# Patient Record
Sex: FEMALE | Race: BLACK OR AFRICAN AMERICAN | NOT HISPANIC OR LATINO | Employment: FULL TIME | URBAN - METROPOLITAN AREA
[De-identification: names, ages, dates, MRNs, and addresses within clinical notes are randomized per-mention and may not be internally consistent; named-entity substitution may affect disease eponyms.]

---

## 2017-08-18 ENCOUNTER — HISTORICAL (OUTPATIENT)
Dept: ADMINISTRATIVE | Facility: HOSPITAL | Age: 27
End: 2017-08-18

## 2017-08-20 LAB — FINAL CULTURE: NO GROWTH

## 2017-10-04 ENCOUNTER — HISTORICAL (OUTPATIENT)
Dept: ADMINISTRATIVE | Facility: HOSPITAL | Age: 27
End: 2017-10-04

## 2017-11-21 ENCOUNTER — HOSPITAL ENCOUNTER (OUTPATIENT)
Dept: OBSTETRICS AND GYNECOLOGY | Facility: HOSPITAL | Age: 27
End: 2017-11-21
Attending: OBSTETRICS & GYNECOLOGY | Admitting: OBSTETRICS & GYNECOLOGY

## 2017-11-29 ENCOUNTER — HISTORICAL (OUTPATIENT)
Dept: ADMINISTRATIVE | Facility: HOSPITAL | Age: 27
End: 2017-11-29

## 2017-12-13 ENCOUNTER — HISTORICAL (OUTPATIENT)
Dept: ADMINISTRATIVE | Facility: HOSPITAL | Age: 27
End: 2017-12-13

## 2018-01-10 ENCOUNTER — HISTORICAL (OUTPATIENT)
Dept: LAB | Facility: HOSPITAL | Age: 28
End: 2018-01-10

## 2018-01-24 ENCOUNTER — HISTORICAL (OUTPATIENT)
Dept: LAB | Facility: HOSPITAL | Age: 28
End: 2018-01-24

## 2018-02-07 ENCOUNTER — HISTORICAL (OUTPATIENT)
Dept: LAB | Facility: HOSPITAL | Age: 28
End: 2018-02-07

## 2018-02-09 LAB — FINAL CULTURE: NORMAL

## 2018-02-27 ENCOUNTER — HOSPITAL ENCOUNTER (OUTPATIENT)
Dept: OBSTETRICS AND GYNECOLOGY | Facility: HOSPITAL | Age: 28
End: 2018-02-28
Attending: OBSTETRICS & GYNECOLOGY | Admitting: OBSTETRICS & GYNECOLOGY

## 2018-12-02 ENCOUNTER — HISTORICAL (OUTPATIENT)
Dept: ADMINISTRATIVE | Facility: HOSPITAL | Age: 28
End: 2018-12-02

## 2018-12-04 LAB — FINAL CULTURE: NORMAL

## 2019-10-16 ENCOUNTER — HISTORICAL (OUTPATIENT)
Dept: ADMINISTRATIVE | Facility: HOSPITAL | Age: 29
End: 2019-10-16

## 2019-10-16 LAB
ABS NEUT (OLG): 9.71 X10(3)/MCL (ref 2.1–9.2)
ALBUMIN SERPL-MCNC: 4.3 GM/DL (ref 3.4–5)
ALBUMIN/GLOB SERPL: 1 RATIO (ref 1.1–2)
ALP SERPL-CCNC: 173 UNIT/L (ref 45–117)
ALT SERPL-CCNC: 22 UNIT/L (ref 12–78)
AST SERPL-CCNC: 19 UNIT/L (ref 15–37)
BASOPHILS # BLD AUTO: 0.1 X10(3)/MCL (ref 0–0.2)
BASOPHILS NFR BLD AUTO: 0 %
BILIRUB SERPL-MCNC: 0.3 MG/DL (ref 0.2–1)
BILIRUBIN DIRECT+TOT PNL SERPL-MCNC: 0.1 MG/DL (ref 0–0.2)
BILIRUBIN DIRECT+TOT PNL SERPL-MCNC: 0.2 MG/DL
BUN SERPL-MCNC: 11 MG/DL (ref 7–18)
CALCIUM SERPL-MCNC: 8.9 MG/DL (ref 8.5–10.1)
CHLORIDE SERPL-SCNC: 103 MMOL/L (ref 98–107)
CHOLEST SERPL-MCNC: 221 MG/DL
CHOLEST/HDLC SERPL: 4.9 {RATIO} (ref 0–4.4)
CO2 SERPL-SCNC: 27 MMOL/L (ref 21–32)
CREAT SERPL-MCNC: 0.8 MG/DL (ref 0.6–1.3)
EOSINOPHIL # BLD AUTO: 0.3 X10(3)/MCL (ref 0–0.9)
EOSINOPHIL NFR BLD AUTO: 2 %
ERYTHROCYTE [DISTWIDTH] IN BLOOD BY AUTOMATED COUNT: 13.4 % (ref 11.5–14.5)
GLOBULIN SER-MCNC: 4.4 GM/ML (ref 2.3–3.5)
GLUCOSE SERPL-MCNC: 67 MG/DL (ref 74–106)
HCT VFR BLD AUTO: 42.6 % (ref 35–46)
HDLC SERPL-MCNC: 45 MG/DL (ref 40–59)
HGB BLD-MCNC: 13.9 GM/DL (ref 12–16)
IMM GRANULOCYTES # BLD AUTO: 0.05 10*3/UL
IMM GRANULOCYTES NFR BLD AUTO: 0 %
LDLC SERPL CALC-MCNC: 154 MG/DL
LYMPHOCYTES # BLD AUTO: 3.5 X10(3)/MCL (ref 0.6–4.6)
LYMPHOCYTES NFR BLD AUTO: 25 %
MCH RBC QN AUTO: 27.6 PG (ref 26–34)
MCHC RBC AUTO-ENTMCNC: 32.6 GM/DL (ref 31–37)
MCV RBC AUTO: 84.5 FL (ref 80–100)
MONOCYTES # BLD AUTO: 0.6 X10(3)/MCL (ref 0.1–1.3)
MONOCYTES NFR BLD AUTO: 4 %
NEUTROPHILS # BLD AUTO: 9.71 X10(3)/MCL (ref 2.1–9.2)
NEUTROPHILS NFR BLD AUTO: 68 %
PLATELET # BLD AUTO: 417 X10(3)/MCL (ref 130–400)
PMV BLD AUTO: 10 FL (ref 7.4–10.4)
POTASSIUM SERPL-SCNC: 3.8 MMOL/L (ref 3.5–5.1)
PROT SERPL-MCNC: 8.7 GM/DL (ref 6.4–8.2)
RBC # BLD AUTO: 5.04 X10(6)/MCL (ref 4–5.2)
SODIUM SERPL-SCNC: 137 MMOL/L (ref 136–145)
TRIGL SERPL-MCNC: 111 MG/DL
TSH SERPL-ACNC: 0.55 MIU/L (ref 0.36–3.74)
VLDLC SERPL CALC-MCNC: 22 MG/DL
WBC # SPEC AUTO: 14.3 X10(3)/MCL (ref 4.5–11)

## 2020-01-09 ENCOUNTER — HISTORICAL (OUTPATIENT)
Dept: ADMINISTRATIVE | Facility: HOSPITAL | Age: 30
End: 2020-01-09

## 2020-05-20 ENCOUNTER — HISTORICAL (OUTPATIENT)
Dept: LAB | Facility: HOSPITAL | Age: 30
End: 2020-05-20

## 2021-06-08 ENCOUNTER — HISTORICAL (OUTPATIENT)
Dept: ADMINISTRATIVE | Facility: HOSPITAL | Age: 31
End: 2021-06-08

## 2021-11-01 ENCOUNTER — HISTORICAL (OUTPATIENT)
Dept: INTERNAL MEDICINE | Facility: CLINIC | Age: 31
End: 2021-11-01

## 2021-11-01 LAB
ABS NEUT (OLG): 6.93 X10(3)/MCL (ref 2.1–9.2)
ALBUMIN SERPL-MCNC: 4 GM/DL (ref 3.5–5)
ALBUMIN/GLOB SERPL: 1.1 RATIO (ref 1.1–2)
ALP SERPL-CCNC: 122 UNIT/L (ref 40–150)
ALT SERPL-CCNC: 30 UNIT/L (ref 0–55)
APPEARANCE, UA: CLEAR
AST SERPL-CCNC: 36 UNIT/L (ref 5–34)
BACTERIA #/AREA URNS AUTO: ABNORMAL /HPF
BASOPHILS # BLD AUTO: 0 X10(3)/MCL (ref 0–0.2)
BASOPHILS NFR BLD AUTO: 0 %
BILIRUB SERPL-MCNC: 0.6 MG/DL
BILIRUB UR QL STRIP: NEGATIVE
BILIRUBIN DIRECT+TOT PNL SERPL-MCNC: 0.2 MG/DL (ref 0–0.5)
BILIRUBIN DIRECT+TOT PNL SERPL-MCNC: 0.4 MG/DL (ref 0–0.8)
BUN SERPL-MCNC: 12.5 MG/DL (ref 7–18.7)
CALCIUM SERPL-MCNC: 9.3 MG/DL (ref 8.7–10.5)
CHLORIDE SERPL-SCNC: 104 MMOL/L (ref 98–107)
CHOLEST SERPL-MCNC: 200 MG/DL
CHOLEST/HDLC SERPL: 5 {RATIO} (ref 0–5)
CO2 SERPL-SCNC: 25 MMOL/L (ref 22–29)
COLOR UR: YELLOW
CREAT SERPL-MCNC: 0.66 MG/DL (ref 0.55–1.02)
EOSINOPHIL # BLD AUTO: 0.3 X10(3)/MCL (ref 0–0.9)
EOSINOPHIL NFR BLD AUTO: 3 %
ERYTHROCYTE [DISTWIDTH] IN BLOOD BY AUTOMATED COUNT: 12.4 % (ref 11.5–14.5)
EST. AVERAGE GLUCOSE BLD GHB EST-MCNC: 111.2 MG/DL
GLOBULIN SER-MCNC: 3.5 GM/DL (ref 2.4–3.5)
GLUCOSE (UA): NEGATIVE
GLUCOSE SERPL-MCNC: 99 MG/DL (ref 74–100)
HBA1C MFR BLD: 5.5 %
HCT VFR BLD AUTO: 36.3 % (ref 35–46)
HDLC SERPL-MCNC: 42 MG/DL (ref 35–60)
HGB BLD-MCNC: 12.2 GM/DL (ref 12–16)
HGB UR QL STRIP: 1 MG/DL
HYALINE CASTS #/AREA URNS LPF: ABNORMAL /LPF
IMM GRANULOCYTES # BLD AUTO: 0.02 10*3/UL
IMM GRANULOCYTES NFR BLD AUTO: 0 %
KETONES UR QL STRIP: NEGATIVE
LDLC SERPL CALC-MCNC: 141 MG/DL (ref 50–140)
LEUKOCYTE ESTERASE UR QL STRIP: NEGATIVE
LYMPHOCYTES # BLD AUTO: 2.1 X10(3)/MCL (ref 0.6–4.6)
LYMPHOCYTES NFR BLD AUTO: 22 %
MCH RBC QN AUTO: 29.3 PG (ref 26–34)
MCHC RBC AUTO-ENTMCNC: 33.6 GM/DL (ref 31–37)
MCV RBC AUTO: 87.1 FL (ref 80–100)
MONOCYTES # BLD AUTO: 0.4 X10(3)/MCL (ref 0.1–1.3)
MONOCYTES NFR BLD AUTO: 4 %
NEUTROPHILS # BLD AUTO: 6.93 X10(3)/MCL (ref 2.1–9.2)
NEUTROPHILS NFR BLD AUTO: 70 %
NITRITE UR QL STRIP: NEGATIVE
NRBC BLD AUTO-RTO: 0 % (ref 0–0.2)
PH UR STRIP: 8 [PH] (ref 4.5–8)
PLATELET # BLD AUTO: 353 X10(3)/MCL (ref 130–400)
PMV BLD AUTO: 9.5 FL (ref 7.4–10.4)
POTASSIUM SERPL-SCNC: 4 MMOL/L (ref 3.5–5.1)
PROT SERPL-MCNC: 7.5 GM/DL (ref 6.4–8.3)
PROT UR QL STRIP: 10 MG/DL
RBC # BLD AUTO: 4.17 X10(6)/MCL (ref 4–5.2)
RBC #/AREA URNS AUTO: ABNORMAL /HPF
SODIUM SERPL-SCNC: 136 MMOL/L (ref 136–145)
SP GR UR STRIP: 1.02 (ref 1–1.03)
SQUAMOUS #/AREA URNS LPF: ABNORMAL /LPF
TRIGL SERPL-MCNC: 86 MG/DL (ref 37–140)
TSH SERPL-ACNC: 0.24 UIU/ML (ref 0.35–4.94)
UROBILINOGEN UR STRIP-ACNC: 2 MG/DL
VLDLC SERPL CALC-MCNC: 17 MG/DL
WBC # SPEC AUTO: 9.8 X10(3)/MCL (ref 4.5–11)
WBC #/AREA URNS AUTO: ABNORMAL /HPF

## 2021-11-12 ENCOUNTER — HISTORICAL (OUTPATIENT)
Dept: ADMINISTRATIVE | Facility: HOSPITAL | Age: 31
End: 2021-11-12

## 2022-01-07 ENCOUNTER — HISTORICAL (OUTPATIENT)
Dept: ADMINISTRATIVE | Facility: HOSPITAL | Age: 32
End: 2022-01-07

## 2022-04-07 ENCOUNTER — HISTORICAL (OUTPATIENT)
Dept: ADMINISTRATIVE | Facility: HOSPITAL | Age: 32
End: 2022-04-07
Payer: MEDICAID

## 2022-04-07 ENCOUNTER — HISTORICAL (OUTPATIENT)
Dept: LAB | Facility: HOSPITAL | Age: 32
End: 2022-04-07

## 2022-04-07 LAB
ABS NEUT (OLG): 6.47 (ref 2.1–9.2)
ALBUMIN SERPL-MCNC: 3.7 G/DL (ref 3.5–5)
ALBUMIN/GLOB SERPL: 1.1 {RATIO} (ref 1.1–2)
ALP SERPL-CCNC: 127 U/L (ref 40–150)
ALT SERPL-CCNC: 18 U/L (ref 0–55)
AST SERPL-CCNC: 19 U/L (ref 5–34)
BASOPHILS # BLD AUTO: 0.1 10*3/UL (ref 0–0.2)
BASOPHILS NFR BLD AUTO: 1 %
BILIRUB SERPL-MCNC: 0.3 MG/DL
BILIRUBIN DIRECT+TOT PNL SERPL-MCNC: 0.1 (ref 0–0.8)
BILIRUBIN DIRECT+TOT PNL SERPL-MCNC: 0.2 (ref 0–0.5)
BUN SERPL-MCNC: 12.1 MG/DL (ref 7–18.7)
CALCIUM SERPL-MCNC: 8.9 MG/DL (ref 8.7–10.5)
CHLORIDE SERPL-SCNC: 107 MMOL/L (ref 98–107)
CHOLEST SERPL-MCNC: 189 MG/DL
CHOLEST/HDLC SERPL: 4 {RATIO} (ref 0–5)
CO2 SERPL-SCNC: 25 MMOL/L (ref 22–29)
CREAT SERPL-MCNC: 0.69 MG/DL (ref 0.55–1.02)
EOSINOPHIL # BLD AUTO: 0.2 10*3/UL (ref 0–0.9)
EOSINOPHIL NFR BLD AUTO: 2 %
ERYTHROCYTE [DISTWIDTH] IN BLOOD BY AUTOMATED COUNT: 12.7 % (ref 11.5–14.5)
FLAG2 (OHS): 70
FLAG3 (OHS): 80
FLAGS (OHS): 80
GLOBULIN SER-MCNC: 3.5 G/DL (ref 2.4–3.5)
GLUCOSE SERPL-MCNC: 115 MG/DL (ref 74–100)
HCT VFR BLD AUTO: 36.5 % (ref 35–46)
HDLC SERPL-MCNC: 42 MG/DL (ref 35–60)
HEMOLYSIS INTERF INDEX SERPL-ACNC: 4
HGB BLD-MCNC: 12.1 G/DL (ref 12–16)
ICTERIC INTERF INDEX SERPL-ACNC: 0
IMM GRANULOCYTES # BLD AUTO: 0.02 10*3/UL
IMM GRANULOCYTES NFR BLD AUTO: 0 %
LDLC SERPL CALC-MCNC: 131 MG/DL (ref 50–140)
LIPEMIC INTERF INDEX SERPL-ACNC: 3
LOW EVENT # SUSPECT FLAG (OHS): 80
LYMPHOCYTES # BLD AUTO: 2.2 10*3/UL (ref 0.6–4.6)
LYMPHOCYTES NFR BLD AUTO: 23 %
MANUAL DIFF? (OHS): NO
MCH RBC QN AUTO: 28.1 PG (ref 26–34)
MCHC RBC AUTO-ENTMCNC: 33.2 G/DL (ref 31–37)
MCV RBC AUTO: 84.7 FL (ref 80–100)
MO BLASTS SUSPECT FLAG (OHS): 40
MONOCYTES # BLD AUTO: 0.4 10*3/UL (ref 0.1–1.3)
MONOCYTES NFR BLD AUTO: 5 %
NEUTROPHILS # BLD AUTO: 6.47 10*3/UL (ref 2.1–9.2)
NEUTROPHILS NFR BLD AUTO: 69 %
NRBC BLD AUTO-RTO: 0 % (ref 0–0.2)
PLATELET # BLD AUTO: 385 10*3/UL (ref 130–400)
PLATELET CLUMPS SUSPECT FLAG (OHS): 10
PMV BLD AUTO: 10 FL (ref 7.4–10.4)
POTASSIUM SERPL-SCNC: 4 MMOL/L (ref 3.5–5.1)
PROT SERPL-MCNC: 7.2 G/DL (ref 6.4–8.3)
RBC # BLD AUTO: 4.31 10*6/UL (ref 4–5.2)
SODIUM SERPL-SCNC: 137 MMOL/L (ref 136–145)
TRIGL SERPL-MCNC: 79 MG/DL (ref 37–140)
VLDLC SERPL CALC-MCNC: 16 MG/DL
WBC # SPEC AUTO: 9.4 10*3/UL (ref 4.5–11)

## 2022-04-19 ENCOUNTER — HISTORICAL (OUTPATIENT)
Dept: ADMINISTRATIVE | Facility: HOSPITAL | Age: 32
End: 2022-04-19
Payer: MEDICAID

## 2022-04-24 VITALS
HEIGHT: 59 IN | SYSTOLIC BLOOD PRESSURE: 139 MMHG | OXYGEN SATURATION: 100 % | DIASTOLIC BLOOD PRESSURE: 89 MMHG | BODY MASS INDEX: 40.76 KG/M2 | WEIGHT: 202.19 LBS

## 2022-05-01 NOTE — HISTORICAL [facility] CERNER
This is a historical note converted from [employer]. Formatting and pictures may have been removed.  Please reference Cermaria eugenia for original formatting and attached multimedia. Chief Complaint  f/u obesity  History of Present Illness  Pt is a??29?year?old female ?in to establish with PCP. Pt has past medical history of recurrent strep tonsillitis and morbid obesity. Pt did not get her labs done from last visit. Her weight is consistent with prior visit. She has stopped soft drinks but has started drinking Hawaiian Punch. Pt has not had any issues with her tonsils since ED visit on 19.  ?   Morbid ?obesity:?Patient states that she has stopped drinking soft drinks.? She is status post ?approximately?1 year and 5 months for baby boy (2018). One of her favorite foods is fried chicken. She is motivated?to eat healthy and?exercise for the promotion of a healthy?lifestyle. Pt has been exercising 3 times a week for30 minutes.  ?   Tonsillitis history: Patient has had multiple episodes of?recurrent?strep tonsillitis: 2015 + for strep, 2018 + for strep,?2018 exudative?tonsillitis positive for strep, 2019+ for strep?and ?acute pharyngitis and tonsillitis?not positive for strep. ?Patient states that despite her treatment? (amoxicillin and ibuprofen) for?recurrent strep tonsillitis on 2019, she still having?sore throat?with?mild pain for which she is taking ibuprofen.? Today her pain level is 0/10. ?Referral to ENT?for recurrent tonsillitis was done?on 2019.? Patient has the associated symptom of snoring.  ?  ?  Patient denies chest pain, shortness of breath,?dyspnea on exertion, palpitations, peripheral edema,?abdominal pain, nausea, vomiting,?diarrhea,?constipation, fatigue, fever, chills,?dysuria,? hematuria, melena,?or hematochezia.  ?   Preventative:  Pap Smear: SWLA in 2017 or 2018. Pt  TB skin test: Done 2019; pt will sign a medical release to get the record from her job.  Pt  got flu shot a work. Pt works at [employer].  Review of Systems  GENERAL:?Negative for abnormal weight loss or weight gain, fatigue, fever, or chills. Rest of system Negative except for stated in HPI.  HEENT:??Negative for head trauma, blurred vision,??sore throat, earache, sinus congestion,??or hearing loss. Rest of system Negative except for stated in HPI.  CARDIOVASCULAR: Negative for palpitations,KENNEDY, ?or chest pain. Rest of system Negative except for stated in HPI.  RESPIRATORY: Negative for SOB, KENNEDY, cough or?wheezing. Rest of system Negative except for stated in HPI.  GASTROINTESTINAL: Negative for abdominal pain, nausea, vomiting, hematochezia, melena, ?change in bowel patterns,?constipation or diarrhea. Rest of system Negative except for stated in HPI.  GENITOURINARY: Negative for dysuria, hematuria, urinary frequency, urinary urgency, urinary hesitancy or flank pain. Rest of system Negative except for stated in HPI.  ENDOCRINE: Negative for fatigue, heat intolerance, cold intolerance, polyuria, polydipsia, or polyphagia. Rest of system Negative except for stated in HPI.  PSYCHOLOGICAL: Negative for depression, anxiety, suicidal or homicidal ideations, or ?natalya. Rest of system Negative except for?stated in HPI.  MUSCULOSKELETAL: Negative for myalgia, joint pain, or joint swelling. Rest of system Negative except for?stated in HPI.  INTEGUMENT: Negative for rash and lesions. Negative except for stated in HPI.  NEUROLOGY: Negative for headaches, dizziness, numbness/ tingling in extremities, syncope, vertigo, ?or gait disturbances. Rest of system Negative except for stated in HPI.  ?  ?  ?  Physical Exam  Vitals & Measurements  T:?36.7? ?C (Oral)? HR:?72(Peripheral)? BP:?125/80? SpO2:?100%?  HT:?151?cm? WT:?87.2?kg? BMI:?38.24? LMP:?03/01/2019 00:00 CST?  GENERAL: Alert and oriented to person, place, time and situation, NAD  HEENT: NCAT, Pupils equally round and reactive to light accommodation,?normal sclera, ?moist  mucous membranes, oropharynx normal, tonsils 2-3+ nonerythematous, normal?nasal turbinates/ nares, ?TM clear bilaterally, neck?supple, thyroid normal, no cervical LAD.  CARDIOVASCULAR: Regular rate and rhythm,? S1 and S2 normal; no murmurs, no rubs, or no gallops; no carotid bruit.  RESPIRATORY:? Lungs clear to auscultation bilaterally; no wheezes, no rales, or no crackles  ABDOMEN: Soft/ Nontender/ Nondistended; Bowel Sounds- normoactive; no abdominal pulsatile mass, no masses, no hernias  EXTREMITY:? No clubbing, no cyanosis and no edema; Dorsalis pedis pulses 2+  MUSCULOSKELETAL: FROM of Upper and Lower extremities; Strength 5/5 of Upper and Lower extremities  PSYCHOLOGICAL: Good judgement and insight; normal affect and mood.  NEUROLOGICAL: Normal gait and ?normal sensation;?no focal deficits; Cranial Nerves 2 -12 grossly intact  ?  Assessment/Plan  1.?Morbid obesity?E66.01  Discussed healthy eating habits. Avoid fried fatty foods and fast foods which are high in saturated fats. Encouraged eating 3 -4 servings daily of fruits and vegetables and eat?lean meats. Decrease carbohydrates made with?refined sugars (cakes, cookies, pasta, bread,etc). Drink plenty of water at least 64 ounce/day. Encourage exercise at least?40 minutes for 3 -4?days/ week.  BMI 38.24 ??Discussed with pt the goal of achieving a healthy BMI of 20 -24.9 which would be weight lose of about 60 lbs in the next 2 yrs. Pt was verbally agreeable with this plan. Goal of 6 to 10 lbs wt loss. CBC with diff, CMP, FLP, TSH ordered.  Ordered:  1160F- Medication reconciliation completed during visit, Morbid obesity, [facility], 10/16/19 10:18:00 CDT  CBC w/ Auto Diff, Routine collect, *Est. 10/23/19 3:00:00 CDT, Blood, Order for future visit, *Est. Stop date 10/23/19 3:00:00 CDT, Lab Collect, Morbid obesity, 10/16/19 10:18:00 CDT  [place] [facility] [place]Est. 01/16/20 3:00:00 CST, obesity, Order for future visit, Morbid obesity  Recurrent streptococcal  tonsillitis, [facility]  Comprehensive Metabolic Panel, Routine collect, *Est. 10/23/19 3:00:00 CDT, Blood, Order for future visit, *Est. Stop date 10/23/19 3:00:00 CDT, Lab Collect, Morbid obesity, 10/16/19 10:18:00 CDT  Internal Referral to ENT, Recurrent Tonsilitis, *Est. 11/13/19 3:00:00 CST, Patient has had multiple episodes of recurrent strep tonsillitis: 12/13/2015 + for strep, 12/2/2018 + for strep, 2/4/2018 exudative tonsillitis positive for strep, 6/17/2019+ for strep and 2/2/ 18 acut...  Lipid Panel, Routine collect, *Est. 10/23/19 3:00:00 CDT, Blood, Order for future visit, *Est. Stop date 10/23/19 3:00:00 CDT, Lab Collect, Morbid obesity, 10/16/19 10:18:00 CDT  Office/Outpatient Visit Level 3 Established [id number] PC, Morbid obesity  Recurrent streptococcal tonsillitis, [facility], 10/16/19 10:18:00 CDT  Thyroid Stimulating Hormone, Routine collect, *Est. 10/23/19 3:00:00 CDT, Blood, Order for future visit, *Est. Stop date 10/23/19 3:00:00 CDT, Lab Collect, Morbid obesity, 10/16/19 10:18:00 CDT  ?  2.?Recurrent streptococcal tonsillitis?J03.01  Patient has had multiple episodes of?recurrent?strep tonsillitis: 12/13/2015 + for strep, 12/2/2018 + for strep,?2/4/2018 exudative?tonsillitis positive for strep, 6/17/2019+ for strep?and 2/2/ 18?acute pharyngitis and tonsillitis?not positive for strep. ?Internal referral for ENT resubmitted today.  Ordered:  [place] [facility] [place], *Est. 01/16/20 3:00:00 CST, obesity, Order for future visit, Morbid obesity  Recurrent streptococcal tonsillitis, [facility]  Office/Outpatient Visit Level 3 Established [id number] PC, Morbid obesity  Recurrent streptococcal tonsillitis, [facility], 10/16/19 10:18:00 CDT  ?  Orders:  ibuprofen, 800 mg = 1 tab(s), Oral, BID, PRN PRN as needed for pain, with food or plenty of fluids, # 20 tab(s), 1 Refill(s), Pharmacy: [id number], Patient Education Provided, Patient Verbalizes Understanding  RTC  in 3 months for follow up obesity; RX ibuprofen refiled.  Referrals  Internal Referral to ENT, Recurrent Tonsilitis, *Est. 19 3:00:00 CST, Patient has had multiple episodes of recurrent strep tonsillitis: 2015 + for strep, 2018 + for strep, 2018 exudative tonsillitis positive for strep, 2019+ for strep and  acut...  [place] [facility] [place], *Est. 20 3:00:00 CST, obesity, Order for future visit, Morbid obesity  Recurrent streptococcal tonsillitis, [facility]   Problem List/Past Medical History  Ongoing  Morbid obesity  Recurrent streptococcal tonsillitis  Historical  Hypertension  Pregnant  Procedure/Surgical History   delivery only; (2018)   Section (None) (2018)  Extraction of Products of Conception, Low Cervical, Open Approach (2018)  Drainage of Buttock Skin, External Approach (2018)  Incision and drainage of abscess (eg, carbuncle, suppurative hidradenitis, cutaneous or subcutaneous abscess, cyst, furuncle, or paronychia); complicated or multiple (2018)  Extirpation of Matter from Left External Auditory Canal, Via Natural or Artificial Opening (2016)  Extirpation of Matter from Right External Auditory Canal, Via Natural or Artificial Opening (2016)  Removal impacted cerumen requiring instrumentation, unilateral (2016)   Medications  ibuprofen 800 mg oral tablet, 800 mg= 1 tab(s), Oral, BID, PRN, 1 refills  Allergies  No Known Allergies  Social History  Abuse/Neglect  No, 2019  Alcohol - Denies Alcohol Use, 12/10/2012  1-2 times per month, 2019  Employment/School  Employed, 2019  Exercise  Exercise duration: 20. Exercise frequency: 5-6 times/week. Exercise type: Walking., 10/16/2019  Home/Environment  Lives with Children, Significant other. Living situation: Home/Independent., 2019  Nutrition/Health  Regular, Good, 10/16/2019  Sexual  Sexually active: Yes. Number of current  partners 1. Sexual orientation: Straight or heterosexual. Gender Identity Identifies as female. Other contraceptive use: birth control. Yes, 10/16/2019  Spiritual/Cultural  Adventist, No, 10/16/2019  Substance Use - Denies Substance Abuse, 12/10/2012  Never, 09/02/2017  Tobacco - Denies Tobacco Use, 10/15/2012  Never (less than 100 in lifetime), No, 10/16/2019  Family History  Alcoholism.: Father.  Hypertension.: Father.  Tobacco use.: Father and Sister.  Immunizations  Vaccine Date Status Comments   tetanus/diphtheria/pertussis, acel(Tdap) 03/01/2018 Given    influenza virus vaccine, inactivated - Not Given Patient Refuses     Health Maintenance  Health Maintenance  ???Pending?(in the next year)  ??? ??OverDue  ??? ? ? ?Diabetes Screening due??and every?  ??? ? ? ?Hypertension Management-BMP due??03/14/19??and every 1??year(s)  ??? ??Due?  ??? ? ? ?Cervical Cancer Screening due??10/16/19??and every?  ??? ? ? ?Influenza Vaccine due??10/16/19??and every?  ??? ??Due In Future?  ??? ? ? ?Alcohol Misuse Screening not due until??01/01/20??and every 1??year(s)  ??? ? ? ?Obesity Screening not due until??01/01/20??and every 1??year(s)  ??? ? ? ?Depression Screening not due until??07/17/20??and every 1??year(s)  ??? ? ? ?ADL Screening not due until??07/18/20??and every 1??year(s)  ??? ? ? ?TB Skin Test not due until??08/29/20??and every 1??year(s)  ??? ? ? ?Blood Pressure Screening not due until??10/15/20??and every 1??year(s)  ??? ? ? ?Body Mass Index Check not due until??10/15/20??and every 1??year(s)  ??? ? ? ?Hypertension Management-Blood Pressure not due until??10/15/20??and every 1??year(s)  ???Satisfied?(in the past 1 year)  ??? ??Satisfied?  ??? ? ? ?ADL Screening on??07/18/19.??Satisfied by Ethel Laurent LPN  ??? ? ? ?Alcohol Misuse Screening on??07/18/19.??Satisfied by Radha Lewis MD  ??? ? ? ?Blood Pressure Screening on??10/16/19.??Satisfied by Juan Carlos Mora  ??? ? ? ?Body Mass Index  Check on??10/16/19.??Satisfied by Juan Carlos Mora  ??? ? ? ?Depression Screening on??07/18/19.??Satisfied by Ethel Laurent LPN  ??? ? ? ?Hypertension Management-Blood Pressure on??10/16/19.??Satisfied by Juan Carlos Mora  ??? ? ? ?Obesity Screening on??10/16/19.??Satisfied by Juan Carlos Mora  ??? ? ? ?TB Skin Test on??08/29/19.  ?

## 2022-05-02 NOTE — HISTORICAL [facility] CERNER
This is a historical note converted from [employer]. Formatting and pictures may have been removed.  Please reference Desiree for original formatting and attached multimedia. Chief Complaint  bilateral feet wounds  History of Present Illness  32 yo Black female being seen for follow-up of tinea pedis.? Hx includes?morbid obesity (BMI 40.22), shoulder arthritis, HLD, chronic tinea pedis, recurrent streptococcal tonsillitis, and abscesses.?? Treated with Terbinafine 250 mg po daily X 14 days in 6/2021 by [facility] [place] Seen again in [facility] for tinea pedis.? Prescribed Diflucan 150 mg weekly for 6 weeks, along with Terbinafine topical cream twice/daily.?? Had been drinking a 20-oz bottle of regular soda with each meal, per self-report on 1/7/2022. She has cut that back to one soda/day.?? Per self-report, was eating at fast food restaurants two to three times/week on 1/7/2022; has cut back on that, as well.?  ?  Review of Most Recent Labs:  11/1/2021:? eGFR?>105.? AST 36.? HgbA1C 5.5.? .? CBC unremarkable.  ?   Today 1/24/2022:  Rash between toes has all resolved with dietary and beverage changes, above.? Using lambs wool between toes, when she takes shoes off; states she did not know she could use that with her shoes on.? No new rashes, lesions, or skin breakdown.?  ??  1/7/2022:  Rash between toes has improved a little bit, but has not completely resolved with oral and topical antifungals.? Skin remains moist, white, and macerated.? Has used a hair dryer on cool setting to try to dry toes out more.? No new skin breakdown, rashes, or other skin issues.  ??  Review of Systems  Except as stated in HPI, all other 10 body systems normal  ?  Physical Exam  Vitals & Measurements  T:?36.4? ?C (Oral)? HR:?90(Peripheral)? RR:?18? BP:?132/80? SpO2:?100%?  ?  General:??VSS; afebrile.??Obese; in no acute distress  Respiratory:?breathing even, quiet, and unlabored.?? No coughing.?  Cardiovascular:? No peripheral edema.??No hemosiderin  staining or varicosities.? Scattered hair distribution over BLE.? Feet and toes warm to touch.? No temperature differences between BLE.? Toenails manicured with purple polish.? DP and PT pulses palpable/dopperlable bilaterally.?  Musculoskeletal:?full range of motion of all extremities; no joint deformities or edema  Neurologic: A&O X 3; cranial nerves grossly intact, no signs of peripheral neurological deficit, motor/sensory function intact  Psychiatric:?Calm, cooperative.??Mood and affect normal.??Responses appropriate  Integumentary:  Toe webs with gentian violet 1%.? All maceration and cracking has resolved.?  ?  ?  ?   Assessment/Plan:  ?   Morbid obesity (BMI 40.22):  This has been identified as a co-factor towards recurrent abscesses and tinea pedis.? May be a case of yeast overgrowth in gut from diet.?  ?   Tinea Pedis:  Resolved with Gentian Violet 1% and dietary/beverage changes.  Teaching?reinforced on underlying causes of condition/disease, s/s of condition/disease, complications, prevention, and treatment of condition/disease.??  Prescribed Gentian Violet 1% between toe webs.? Instructed her she can decrease that to one or two times/week, as needed for recurrent moisture and maceration in toe webs.? Instructed her she can use lambs wool between toes with her shoes on.? Reinforced teaching on lambs wool for drying/wicking of moisture to decrease fungal growth in toe webs.?  ?  ?  ?  We will see Ms. Suarez on a PRN basis.? She does not have dx of diabetes and does not require routine diabetic foot care.?  ?  Assessment/Plan  1.?Tinea pedis of both feet?B35.3  2.?Morbid obesity?E66.01  3.?BMI 40.0-44.9, adult?Z68.41   Problem List/Past Medical History  Ongoing  AC (acromioclavicular) arthritis  Elevated cholesterol  Morbid obesity  Recurrent streptococcal tonsillitis  Tinea pedis of both feet  Historical  Hypertension  Pregnant  Procedure/Surgical History   delivery only; (2018)    Section (None) (02/28/2018)  Extraction of Products of Conception, Low Cervical, Open Approach (02/28/2018)  Drainage of Buttock Skin, External Approach (01/19/2018)  Incision and drainage of abscess (eg, carbuncle, suppurative hidradenitis, cutaneous or subcutaneous abscess, cyst, furuncle, or paronychia); complicated or multiple (01/19/2018)  Extirpation of Matter from Left External Auditory Canal, Via Natural or Artificial Opening (02/18/2016)  Extirpation of Matter from Right External Auditory Canal, Via Natural or Artificial Opening (02/18/2016)  Removal impacted cerumen requiring instrumentation, unilateral (02/18/2016)   Medications  amLODIPine 2.5 mg oral tablet, 2.5 mg= 1 tab(s), Oral, Daily, 5 refills  gentian violet 1% topical solution, 1 gloria, TOP, Daily  meloxicam 7.5 mg oral tablet, 7.5 mg= 1 tab(s), Oral, Daily, 3 refills,? ?Not taking  MiraLax oral powder for reconstitution, 17 gm, Oral, Daily, 1 refills,? ?Not taking  terbinafine 1% topical cream, 1 gloria, TOP, BID, 1 refills,? ?Not taking  Zofran ODT 8 mg oral tablet, disintegrating, 8 mg= 1 tab(s), Oral, q8hr, PRN,? ?Not taking: PRN  Allergies  No Known Allergies  Social History  Abuse/Neglect  No, 01/24/2022  Alcohol - Denies Alcohol Use, 12/10/2012  1-2 times per month, 06/17/2019  Employment/School  Employed, 06/17/2019  Exercise  Exercise duration: 20. Exercise frequency: 5-6 times/week. Exercise type: Walking., 10/16/2019  Financial/Legal Situation  No insurance, 10/20/2021  Home/Environment  Lives with Children, Significant other. Living situation: Home/Independent., 06/17/2019    Never in , 10/20/2021  Nutrition/Health  Regular, Good, 10/16/2019  Sexual  Sexually active: Yes. Number of current partners 1. Sexual orientation: Straight or heterosexual. Gender Identity Identifies as female. Other contraceptive use: birth control. Yes, 10/16/2019  Spiritual/Cultural  Sabianist, No, 10/16/2019  Substance Use - Denies Substance Abuse,  12/10/2012  Never, 09/02/2017  Tobacco - Denies Tobacco Use, 10/15/2012  Never (less than 100 in lifetime), N/A, 01/24/2022  Family History  Alcoholism.: Father.  Hypertension.: Father.  Tobacco use.: Father and Sister.  Immunizations  Vaccine Date Status Comments   influenza virus vaccine, inactivated 10/21/2021 Given    COVID-19 MRNA, LNP-S, PF- Pfizer 10/19/2021 Given    COVID-19 MRNA, LNP-S, PF- Pfizer 01/19/2021 Given    COVID-19 MRNA, LNP-S, PF- Pfizer 12/29/2020 Given    tetanus/diphtheria/pertussis, acel(Tdap) 03/01/2018 Given    influenza virus vaccine, inactivated - Not Given Patient Refuses     tetanus/diphtheria/pertussis, acel(Tdap) 03/21/2012 Recorded    influenza virus vaccine, inactivated 03/21/2012 Recorded    influenza virus vaccine, inactivated 09/03/2010 Recorded    hepatitis B pediatric vaccine 06/05/2008 Recorded    poliovirus vaccine, live, trivalent 07/11/1995 Recorded    measles/mumps/rubella virus vaccine 07/07/1994 Recorded    poliovirus vaccine, live, trivalent 03/09/1994 Recorded    measles/mumps/rubella virus vaccine 03/09/1994 Recorded    poliovirus vaccine, live, trivalent 1990 Recorded    poliovirus vaccine, live, trivalent 1990 Recorded    Health Maintenance  Health Maintenance  ???Pending?(in the next year)  ??? ??OverDue  ??? ? ? ?TB Skin Test due??08/29/20??and every 1??year(s)  ??? ??Due?  ??? ? ? ?Obesity Screening due??01/01/22??and every 1??year(s)  ??? ? ? ?Alcohol Misuse Screening due??01/02/22??and every 1??year(s)  ??? ??Due In Future?  ??? ? ? ?ADL Screening not due until??06/08/22??and every 1??year(s)  ??? ? ? ?Influenza Vaccine not due until??10/01/22??and every 1??day(s)  ??? ? ? ?Diabetes Screening not due until??11/01/22??and every 1??year(s)  ??? ? ? ?Hypertension Management-BMP not due until??11/01/22??and every 1??year(s)  ??? ? ? ?Body Mass Index Check not due until??01/07/23??and every 1??year(s)  ??? ? ? ?Depression Screening not due  until??23??and every 1??year(s)  ???Satisfied?(in the past 1 year)  ??? ??Satisfied?  ??? ? ? ?ADL Screening on??21.??Satisfied by Jena Gr LPN  ??? ? ? ?Alcohol Misuse Screening on??21.??Satisfied by Jena Gr LPN  ??? ? ? ?Blood Pressure Screening on??22.??Satisfied by Janes Orosco RN  ??? ? ? ?Body Mass Index Check on??21.??Satisfied by Sanket Da Silva LPN  ??? ? ? ?Depression Screening on??22.??Satisfied by Lorena Ramsey  ??? ? ? ?Diabetes Screening on??21.??Satisfied by Burton Burns  ??? ? ? ?Hypertension Management-Blood Pressure on??22.??Satisfied by Janes Orosco RN  ??? ? ? ?Influenza Vaccine on??21.??Satisfied by Tia Gutierrez LPN  ??? ? ? ?Obesity Screening on??21.??Satisfied by Sanket Da Silva LPN  ?  Lab Results  Last 6 Months?  ??Lipid Profile: ?Basic Metabolic Panel: ?Hematology:   ?: () ?Sodium Lvl: 136 (21) ?Hgb: 12.2 (21)   ?: () ?Potassium Lvl: 4.0 (21) ?Hgb A1c: 5.5 (21)   ?: () ?: () ?WBC: 9.8 (21)   ?LDL: 141.00 (21) ?: () ?Platelet: 353 (21)   ? ?BUN: 12.5 (21) ?INR: ------   ? ?Creatinine: 0.66 (21) ?   ? ?: () ?   ?  Urinalysis:?  ??: () ?: ()   ?: () ?: ()   ?UA Ketones: Negative (21) ?: ()   ?: () ?: ()   ?: () ?: ()   ?: () ?: ()   ?  ?  Additional - Last 6 Months?  ??A/G Ratio: 1.1 (21) ?Abs Baso: 0.0 (21) ?Abs Eos: 0.3 (21)   ?Abs Lymph: 2.1 (21) ?Abs Mono: 0.4 (21) ?Abs Neut: 6.93 (21)   ?Abs Neutro: 6.93 (21) ?Albumin Lvl: 4.0 (21) ?Alk Phos: 122 (21)   ?ALT: 30 (21) ?AST: 36 (21) ?Basophil Auto: 0 (21)   ?Bili Direct: 0.2 (21) ?Bili Indirect: 0.40 (21) ?Bili Total: 0.6 (21)   ?Calcium Lvl: 9.3 (21) ?Chloride: 104 (21) ?Chol: 200 (21)   ?Chol/HDL: 5 (21) ?CO2: 25 (21) ?EA.2 (21)   ?eGFR-AA: >105 (21)  ?eGFR-SUZAN: >105 (21) ?Eos Auto: 3 (21)   ?Globulin: 3.5 (21) ?Glucose Lvl: 99 (21) ?Hct: 36.3 (21)   ?HDL: 42 (21) ?IG#: .020 (21) ?IG%: 0 (21)   ?Lymph Auto: 22 (21) ?MCH: 29.3 (21) ?MCHC: 33.6 (21)   ?MCV: 87.1 (21) ?Mono Auto: 4 (21) ?MPV: 9.5 (21)   ?Neutro Auto: 70 (21) ?NRBC%: 0.0 (21) ?RBC: 4.17 (21)   ?RDW: 12.4 (21) ?Total Protein: 7.5 (21) ?Tri (21)   ?TSH: 0.2396 (21) ?U Beta hCG Ql: Negative (21) ?UA Appear: CLEAR (21)   ?UA Bact Interp: None Seen (21) ?UA Bacteria: NONE SEEN (21) ?UA Bili: Negative (21)   ?UA Blood: 2+ (21) ?UA Color: YELLOW (21) ?UA Glucose: Negative (21)   ?UA Hyal Cast Interp: 0-2 (21) ?UA Leuk Est: Trace (21) ?UA Nitrite: Negative (21)   ?UA pH: 6.0 (21) ?UA Protein: Negative (21) ?UA RBC: 0-2 (21)   ?UA RBC Interp: 51-99 (21) ?UA Spec Grav: 1.021 (21) ?UA Squam Epi Interp:  (21)   ?UA Squam Epithelial: NONE SEEN (21) ?UA Urobilinogen: 1.0 (21) ?UA WBC: NONE SEEN (21)   ?UA WBC Interp: 0-2 (21) ?VLDL: 17 (21) ?   ?  ?  ?

## 2022-05-25 ENCOUNTER — OFFICE VISIT (OUTPATIENT)
Dept: FAMILY MEDICINE | Facility: CLINIC | Age: 32
End: 2022-05-25
Payer: MEDICAID

## 2022-05-25 DIAGNOSIS — I10 PRIMARY HYPERTENSION: ICD-10-CM

## 2022-05-25 DIAGNOSIS — E78.2 MIXED HYPERLIPIDEMIA: ICD-10-CM

## 2022-05-25 DIAGNOSIS — B35.3 TINEA PEDIS OF BOTH FEET: Primary | ICD-10-CM

## 2022-05-25 DIAGNOSIS — E66.01 MORBID OBESITY: ICD-10-CM

## 2022-05-25 PROBLEM — M19.019 ACROMIOCLAVICULAR JOINT ARTHRITIS: Status: ACTIVE | Noted: 2022-05-25

## 2022-05-25 PROBLEM — J03.00 STREPTOCOCCAL TONSILLITIS: Status: ACTIVE | Noted: 2022-05-25

## 2022-05-25 PROBLEM — E78.5 HYPERLIPIDEMIA: Status: ACTIVE | Noted: 2022-05-25

## 2022-05-25 PROCEDURE — 99214 OFFICE O/P EST MOD 30 MIN: CPT | Mod: 95,,, | Performed by: FAMILY MEDICINE

## 2022-05-25 PROCEDURE — 1159F MED LIST DOCD IN RCRD: CPT | Mod: CPTII,95,, | Performed by: FAMILY MEDICINE

## 2022-05-25 PROCEDURE — 1160F PR REVIEW ALL MEDS BY PRESCRIBER/CLIN PHARMACIST DOCUMENTED: ICD-10-PCS | Mod: CPTII,95,, | Performed by: FAMILY MEDICINE

## 2022-05-25 PROCEDURE — 1160F RVW MEDS BY RX/DR IN RCRD: CPT | Mod: CPTII,95,, | Performed by: FAMILY MEDICINE

## 2022-05-25 PROCEDURE — 1159F PR MEDICATION LIST DOCUMENTED IN MEDICAL RECORD: ICD-10-PCS | Mod: CPTII,95,, | Performed by: FAMILY MEDICINE

## 2022-05-25 PROCEDURE — 99214 PR OFFICE/OUTPT VISIT, EST, LEVL IV, 30-39 MIN: ICD-10-PCS | Mod: 95,,, | Performed by: FAMILY MEDICINE

## 2022-05-25 RX ORDER — MELOXICAM 15 MG/1
15 TABLET ORAL DAILY
COMMUNITY
Start: 2022-05-13 | End: 2022-07-06 | Stop reason: SDUPTHER

## 2022-05-25 RX ORDER — GENTIAN VIOLET 1% 10 MG/ML
1 LIQUID TOPICAL DAILY PRN
COMMUNITY
Start: 2022-01-07 | End: 2022-10-19

## 2022-05-25 RX ORDER — AMLODIPINE BESYLATE 5 MG/1
1 TABLET ORAL DAILY
COMMUNITY
Start: 2022-05-16 | End: 2022-07-06 | Stop reason: SDUPTHER

## 2022-05-25 RX ORDER — STREPTOMYCIN 1 G/1
1 INJECTION, POWDER, LYOPHILIZED, FOR SOLUTION INTRAMUSCULAR
COMMUNITY
Start: 2022-04-20 | End: 2022-08-22

## 2022-05-25 NOTE — PROGRESS NOTES
[facility]  [address] [address]  Phone: [phone]   Primary Care Telemedicine Note   The patient location is:  Patient Home    The chief complaint leading to consultation is: Recurrent skin infection-Tinea pedis   Total time spent with patient: 30 minutes (Start time: 1550 and End time 1620)  Visit type: Virtual visit with synchronous audio only and video   Each patient to whom he or she provides medical services by telemedicine is:  (1) informed of the relationship between the physician and patient and the respective role of any other health care provider with respect to management of the patient; and (2) notified that he or she may decline to receive medical services by telemedicine and may withdraw from such care at any time.     I have reviewed the patient's name. I verbally reviewed the past medical history, active medication list and allergy list with the patient and verified with a picture ID if applicable.I have verified that this patient is in the state of [place]. After obtaining verbal consent/patient identification using name and , patient was treated using telemedicine (audio only ) communication, according to [facility] protocols. I, Radha Lewis MD, conducted the visit from distant site, [facility] and [facility]. The patient participated in the visit  at a non-[facility] location selection by the patient ( or patient's representative), patients's home. I am licensed in the state where the patient stated they are located. The patient (or patient's representative) stated that they understood and accepted the privacy and security risks to their information at their location. This visit occurred during the Coronavirus (COVID-19) Public Health Emergency.   The patient was offered Telemedicine (audio/visual) communication but he/she declined, thus audio only telemedicine communication visit was  conducted because of lack of access to Internet          CC:As Above   HPI:   Problem List Items Addressed This Visit        Derm    Tinea pedis - Primary    Overview     Tinea pedis started prior to 6/2021; treated with terbinafine 250 mg daily x 14 days in 6/2021 by  C and PCP (2 attempts). Pt referred to [facility] because of worsening infection with macerated skin between toes with whitish fungal element.  She was started on gentian violet 1% soln daily prn on 4/19/202 by Wound Care. Pt had little to no resolution. Pt was started (4/20/2022)  on CMPD (compound)  of clindamycin, mupirocin, itraconazole, and streptomycin mixed in a diluent and sprayed to the toes BID  Along with gentian violet soln by [facility].  Duration: almost a year. Previous podiatry referral has been  delayed due to extensive wait. Pt is needing a podiatry referral urgently to aid in resolution of this  nearly year long issue. Pt complains of symptoms of intense itching from infection.            Relevant Orders    Comprehensive Metabolic Panel    CBC Auto Differential    Hemoglobin A1C    Ambulatory referral/consult to Podiatry       Cardiac/Vascular    Hypertension    Overview     The patient presents with essential hypertension.  The patient is tolerating the medication well and is in excellent compliance.  The patient is experiencing no side effects.  Counseling was offered regarding low salt diets.  The patient has a reduced salt intake.  The patient denies chest pain, palpitations, shortness of breath, dyspnea on exertion, left or murmur neck pain, nausea, vomiting, diaphoresis, paroxysmal nocturnal dyspnea, and orthopnea.   Hypertension Medications             amLODIPine (NORVASC) 5 MG tablet Take 1 tablet by mouth once daily.             Relevant Orders    Comprehensive Metabolic Panel    CBC Auto Differential    TSH    Lipid Panel    Urinalysis Complete    Hyperlipidemia    Relevant Orders    Diet  controlled  Comprehensive Metabolic Panel    CBC Auto Differential    Lipid Panel    Hemoglobin A1C       Endocrine    Morbid obesity    Overview     The patient presents with obesity.  Denies bulimia, amenorrhea, cold intolerance, edema, hip pain, hirsutism, knee pain, polydipsia, polyuria, thirst and weakness.  The patient does not perform regular exercise.  Previous treatments for obesity :self-directed dieting without success.  The patient and I discussed the importance of exercise and healthy diet with portion control.  Wt Readings from Last 4 Encounters:   21 91.7 kg (202 lb 2.6 oz)                     The patient's Health Maintenance was reviewed and the following appears to be due at this time:   Health Maintenance Due   Topic Date Due    Hepatitis C Screening  Never done    Cervical Cancer Screening  Never done    HIV Screening  Never done          Outpatient Medications Prior to Visit   Medication Sig Dispense Refill    gentian violet 1 % topical solution Apply 1 mL topically daily as needed.      amLODIPine (NORVASC) 5 MG tablet Take 1 tablet by mouth once daily.      meloxicam (MOBIC) 15 MG tablet Take 15 mg by mouth once daily.      streptomycin 1 gram injection Inject 1 g into the muscle twice a week.         PMH:  Past Medical History:   Diagnosis Date    AC (acromioclavicular) arthritis     Hyperlipidemia     Hypertension      Past Surgical History:   Procedure Laterality Date     SECTION       Review of patient's allergies indicates:  No Known Allergies  Current Outpatient Medications on File Prior to Visit   Medication Sig Dispense Refill    gentian violet 1 % topical solution Apply 1 mL topically daily as needed.      amLODIPine (NORVASC) 5 MG tablet Take 1 tablet by mouth once daily.      meloxicam (MOBIC) 15 MG tablet Take 15 mg by mouth once daily.      streptomycin 1 gram injection Inject 1 g into the muscle twice a week.         Social History     Socioeconomic  History    Marital status: Single   Tobacco Use    Smoking status: Never Smoker    Smokeless tobacco: Never Used   Substance and Sexual Activity    Alcohol use: Yes     Comment: Ocassionally    Drug use: Never    Sexual activity: Yes     History reviewed. No pertinent family history.    ROS   Patient denies chest pain, shortness of breath, dyspnea of exertion, palpitations, peripheral edema, abdominal pain, nausea,vomiting, diarrhea, constipation,fatigue,fever, chills, dysuria, hematuria, melena, polydipsia, polyuria or hematochezia. Rest of 12 point ROS is negative.      Physical Exam    No vital signs and no physical examination because of telemedicine (audio only) visit during COVID-29 pandemic.       DIAGNOSIS  Encounter Diagnoses   Name Primary?    Tinea pedis of both feet Yes    Primary hypertension     Mixed hyperlipidemia     Morbid obesity           PLAN:     I am having Shani Suarez maintain her amLODIPine, meloxicam, gentian violet, streptomycin and compound mixture with spray.    Problem List Items Addressed This Visit        Derm    Tinea pedis - Primary    Relevant Orders    Comprehensive Metabolic Panel    CBC Auto Differential    Hemoglobin A1C    Ambulatory referral/consult to Podiatry       Cardiac/Vascular    Hypertension    Relevant Orders    Comprehensive Metabolic Panel    CBC Auto Differential    TSH    Lipid Panel    Urinalysis Complete    Hyperlipidemia    Relevant Orders    Comprehensive Metabolic Panel    CBC Auto Differential    Lipid Panel    Hemoglobin A1C       Endocrine    Morbid obesity         Tinea pedis compounds started by [facility] wound care (prior to visit)  5% Vanc, 4% Tobra, 2% Voricon CMPD powder     Add as: unknown       5% Vanc, 4% Tobra, 2% Voricon CMPD powder, See Instructions, Spray 1 gram of solution between toes and allow to soak for 2 to 3 minutes and dry thoroughly.  Do twice/day until rash resolved.  Resume as-needed therafter., # 60 gm, 5 Refill(s),  Pharmacy... 4/19/2022     Spray Kit 10 ml  USE 10 ML FOR MEDICATION ADMINISTRATION WITH SPRAY BOTTLE #2 30ML SPRAY BOTTLES, #1 FUNNEL, SALINE 5ML VIALS #600ML      Clindamycin/Mupirocin/Itraconazole 150/20/50mg topical capsule [[id number]]     Add as: unknown     Months of dispense information: 2; Number of dispenses: 1     MIX THE CONTENTS OF 1 CAPSULE WITH DILUENT. APPLY TO AFFECTED AREAS. PERFORM TWICE DAILY.         Medication List with Changes/Refills   Current Medications    AMLODIPINE (NORVASC) 5 MG TABLET    Take 1 tablet by mouth once daily.    GENTIAN VIOLET 1 % TOPICAL SOLUTION    Apply 1 mL topically daily as needed.    MELOXICAM (MOBIC) 15 MG TABLET    Take 15 mg by mouth once daily.    STREPTOMYCIN 1 GRAM INJECTION    Inject 1 g into the muscle twice a week.          Orders Placed This Encounter   Procedures    Comprehensive Metabolic Panel     Standing Status:   Future     Standing Expiration Date:   7/25/2022    CBC Auto Differential     Standing Status:   Future     Standing Expiration Date:   7/25/2022    TSH     Standing Status:   Future     Standing Expiration Date:   7/25/2022    Lipid Panel     Standing Status:   Future     Standing Expiration Date:   7/25/2022    Hemoglobin A1C     Standing Status:   Future     Standing Expiration Date:   7/25/2022    Urinalysis Complete     Standing Status:   Future     Number of Occurrences:   1     Standing Expiration Date:   7/25/2022     Order Specific Question:   Specimen Source     Answer:   Urine    Ambulatory referral/consult to Podiatry     Standing Status:   Future     Standing Expiration Date:   6/25/2023     Referral Priority:   Urgent     Referral Type:   Consultation     Referral Reason:   Specialty Services Required     Requested Specialty:   Podiatry     Number of Visits Requested:   1       Referral for Podiatry submitted for tinea pedis -Dr. Joyner  Continue following up with [facility]  HEALTH EDUCATION RECOMMENDATIONS:  weight  control, diet and cholesterol and encouraged exercise 150 mins/ wk (30 minutes at least 5 times/wk) if possible.  Continue low sodium, low fat and low carbohydrate ADA eating habits.  Continue current medications.  Medication side effects discussed with the patient.   All patient's questions were answered to the patient's satisfaction.  Patient expressed verbal understanding and agreeent with the above treatment.     Radha Lewis MD

## 2022-06-03 ENCOUNTER — NURSE TRIAGE (OUTPATIENT)
Dept: ADMINISTRATIVE | Facility: CLINIC | Age: 32
End: 2022-06-03
Payer: MEDICAID

## 2022-06-03 NOTE — TELEPHONE ENCOUNTER
Tabitha calling on call nurse states she was diagnosed with covid 2 days ago by  doctor and currently having bilateral leg weakness, intermittent left hand and left foot tingling since yesterday.Denies numbness. Full  ROM. Pt wants to know if these are expected symptoms for Covid. Reports Hx of Chron's Disease.   Advised pt per triage protocol to discuss with PCP, informed pt a high priority message will be sent to PCP with request for call back by office nurse to pt within next 1 hours. Instructed to contact on call nurse with new/worsening symptoms. V/u.     Reason for Disposition   HIGH RISK for severe COVID complications (e.g., weak immune system, age > 64 years, obesity with BMI > 25, pregnant, chronic lung disease or other chronic medical condition) (Exception: Already seen by PCP and no new or worsening symptoms.)    Additional Information   Negative: SEVERE difficulty breathing (e.g., struggling for each breath, speaks in single words)   Negative: Difficult to awaken or acting confused (e.g., disoriented, slurred speech)   Negative: Bluish (or gray) lips or face now   Negative: Shock suspected (e.g., cold/pale/clammy skin, too weak to stand, low BP, rapid pulse)   Negative: Sounds like a life-threatening emergency to the triager   Negative: SEVERE or constant chest pain or pressure  (Exception: Mild central chest pain, present only when coughing.)   Negative: MODERATE difficulty breathing (e.g., speaks in phrases, SOB even at rest, pulse 100-120)   Negative: Headache and stiff neck (can't touch chin to chest)   Negative: Oxygen level (e.g., pulse oximetry) 90 percent or lower   Negative: Chest pain or pressure   Negative: Patient sounds very sick or weak to the triager   Negative: MILD difficulty breathing (e.g., minimal/no SOB at rest, SOB with walking, pulse <100)   Negative: Fever > 103 F (39.4 C)   Negative: [1] Fever > 101 F (38.3 C) AND [2] over 60 years of age   Negative: [1] Fever >  100.0 F (37.8 C) AND [2] bedridden (e.g., nursing home patient, CVA, chronic illness, recovering from surgery)    Protocols used: CORONAVIRUS (COVID-19) DIAGNOSED OR [id number]

## 2022-07-06 ENCOUNTER — OFFICE VISIT (OUTPATIENT)
Dept: FAMILY MEDICINE | Facility: CLINIC | Age: 32
End: 2022-07-06
Payer: MEDICAID

## 2022-07-06 VITALS
RESPIRATION RATE: 18 BRPM | BODY MASS INDEX: 41.12 KG/M2 | HEIGHT: 59 IN | WEIGHT: 204 LBS | DIASTOLIC BLOOD PRESSURE: 81 MMHG | OXYGEN SATURATION: 100 % | SYSTOLIC BLOOD PRESSURE: 124 MMHG | TEMPERATURE: 98 F | HEART RATE: 85 BPM

## 2022-07-06 DIAGNOSIS — B35.3 TINEA PEDIS OF BOTH FEET: Primary | ICD-10-CM

## 2022-07-06 DIAGNOSIS — G89.29 CHRONIC HEEL PAIN, LEFT: ICD-10-CM

## 2022-07-06 DIAGNOSIS — R73.03 PREDIABETES: ICD-10-CM

## 2022-07-06 DIAGNOSIS — M79.672 CHRONIC HEEL PAIN, LEFT: ICD-10-CM

## 2022-07-06 DIAGNOSIS — I10 PRIMARY HYPERTENSION: ICD-10-CM

## 2022-07-06 DIAGNOSIS — E78.2 MIXED HYPERLIPIDEMIA: ICD-10-CM

## 2022-07-06 PROCEDURE — 3074F SYST BP LT 130 MM HG: CPT | Mod: CPTII,,, | Performed by: FAMILY MEDICINE

## 2022-07-06 PROCEDURE — 99214 OFFICE O/P EST MOD 30 MIN: CPT | Mod: S$PBB,,, | Performed by: FAMILY MEDICINE

## 2022-07-06 PROCEDURE — 3008F BODY MASS INDEX DOCD: CPT | Mod: CPTII,,, | Performed by: FAMILY MEDICINE

## 2022-07-06 PROCEDURE — 99215 OFFICE O/P EST HI 40 MIN: CPT | Mod: PBBFAC | Performed by: FAMILY MEDICINE

## 2022-07-06 PROCEDURE — 3079F DIAST BP 80-89 MM HG: CPT | Mod: CPTII,,, | Performed by: FAMILY MEDICINE

## 2022-07-06 PROCEDURE — 3074F PR MOST RECENT SYSTOLIC BLOOD PRESSURE < 130 MM HG: ICD-10-PCS | Mod: CPTII,,, | Performed by: FAMILY MEDICINE

## 2022-07-06 PROCEDURE — 3008F PR BODY MASS INDEX (BMI) DOCUMENTED: ICD-10-PCS | Mod: CPTII,,, | Performed by: FAMILY MEDICINE

## 2022-07-06 PROCEDURE — 3079F PR MOST RECENT DIASTOLIC BLOOD PRESSURE 80-89 MM HG: ICD-10-PCS | Mod: CPTII,,, | Performed by: FAMILY MEDICINE

## 2022-07-06 PROCEDURE — 99214 PR OFFICE/OUTPT VISIT, EST, LEVL IV, 30-39 MIN: ICD-10-PCS | Mod: S$PBB,,, | Performed by: FAMILY MEDICINE

## 2022-07-06 RX ORDER — METFORMIN HYDROCHLORIDE 500 MG/1
500 TABLET ORAL
Qty: 30 TABLET | Refills: 5 | Status: SHIPPED | OUTPATIENT
Start: 2022-07-06 | End: 2022-10-19 | Stop reason: SDUPTHER

## 2022-07-06 RX ORDER — AMLODIPINE BESYLATE 5 MG/1
5 TABLET ORAL DAILY
Qty: 30 TABLET | Refills: 11 | Status: SHIPPED | OUTPATIENT
Start: 2022-07-06 | End: 2022-10-19 | Stop reason: SDUPTHER

## 2022-07-06 RX ORDER — INSULIN PUMP SYRINGE, 3 ML
EACH MISCELLANEOUS
Qty: 1 EACH | Refills: 1 | Status: SHIPPED | OUTPATIENT
Start: 2022-07-06 | End: 2023-04-26 | Stop reason: SDUPTHER

## 2022-07-06 RX ORDER — LANCETS
EACH MISCELLANEOUS
Qty: 50 EACH | Refills: 11 | Status: SHIPPED | OUTPATIENT
Start: 2022-07-06 | End: 2022-08-08

## 2022-07-06 RX ORDER — MELOXICAM 15 MG/1
15 TABLET ORAL DAILY
Qty: 30 TABLET | Refills: 11 | Status: SHIPPED | OUTPATIENT
Start: 2022-07-06 | End: 2022-10-19

## 2022-07-06 NOTE — PROGRESS NOTES
[facility]  [address] [address]  Phone: [phone]   Subjective:      Patient ID: Shani Suarez is a 32 y.o. female.    Chief Complaint: Results    Problem List Items Addressed This Visit        Derm    Tinea pedis - Primary    Overview       Today, pt states tinea pedis has improved somewhat but still consistently between 3rd, 4th, and 5th toes. Pt has not been contacted by [facility]. Welch (he is no longer working at that site) and wound care clinic [facility].  Pt will be referred to another podiatrist and referred again to wound care clinic for this issue.     5/25/22 visit  Tinea pedis started prior to 6/2021; treated with terbinafine 250 mg daily x 14 days in 6/2021 by PCP. Pt referred to [facility] because of worsening infection with macerated skin between toes with whitish fungal element.  She was tarted on gentian violet 1% soln daily prn on 4/19/2021. Pt had little to no resolution. Pt was placed on 4/20/2022 started on CMPD (compound)  of clindamycin, mupirocin, itraconazole, and streptomycin mixed in a diluent and sprayed to the toes BID by [facility].  Duration: almost a year. Previous podiatry referral has been  delayed due to extensive wait. Pt is needing a podiatry referral urgently to aid in resolution of this  nearly year long issue. Pt complains of symptoms of intense itching from infection.            Relevant Orders    Ambulatory referral/consult to Wound Clinic    Ambulatory referral/consult to Podiatry       Cardiac/Vascular    Hypertension    Overview     The patient presents with essential hypertension.  The patient is tolerating the medication well and is in excellent compliance.  The patient is experiencing no side effects.  Counseling was offered regarding low salt diets.  The patient has a reduced salt intake.  The patient denies chest pain, palpitations, shortness of breath, dyspnea on exertion,  left or murmur neck pain, nausea, vomiting, diaphoresis, paroxysmal nocturnal dyspnea, and orthopnea.   Hypertension Medications             amLODIPine (NORVASC) 5 MG tablet Take 1 tablet by mouth once daily.                       Relevant Medications    amLODIPine (NORVASC) 5 MG tablet    Hyperlipidemia    Overview     The patient presents with hyperlipidemia.  The patient reports tolerating the medication well and is in excellent compliance.  There have been no medication side effects.  The patient denies chest pain, neuropathy, and myalgias.  The patient has reduced fat intake and has been exercising.  Current treatment has included the medications listed in the med card.    Lab Results   Component Value Date    CHOL 190 07/06/2022    CHOL 189 04/07/2022    CHOL 200 11/01/2021       Lab Results   Component Value Date    HDL 42 07/06/2022    HDL 42 04/07/2022    HDL 42 11/01/2021     No results found for: LDLCALC    Lab Results   Component Value Date    TRIG 103 07/06/2022    TRIG 79 04/07/2022    TRIG 86 11/01/2021     No results found for: CHOLHDL  Lab Results   Component Value Date    ALT 14 07/06/2022    AST 18 07/06/2022    ALKPHOS 120 07/06/2022    BILITOT 0.3 07/06/2022                   Endocrine    Prediabetes    Overview     Borderline prediabetes HgA1c 7/6/2022 was 5.7 with prior 11/2021 being 5.5.  Pt's tinea pedis not resolving and suspected that IGT/ borderline prediabetes may be cause of agents not being successful in resolving this issue. Discussed this in detail with the patient and pt agrees to eat ADA eating habit (discussed) and start metformin for 4 -6 months to control CBS.            Relevant Medications    lancets Misc    blood sugar diagnostic Strp    blood-glucose meter kit    metFORMIN (GLUCOPHAGE) 500 MG tablet    Other Relevant Orders    Ambulatory referral/consult to Wound Clinic    Ambulatory referral/consult to Podiatry       Orthopedic    Chronic heel pain, left    Overview     Pt  complains of left heel pain x 2-3 months. Pt states it is difficult to ambulate. Pain is sharp and worsened  with ambulation and prolonged standing. Pain 8/10 intermittently. Relieved with rest. Pt denies trauma.            Relevant Medications    meloxicam (MOBIC) 15 MG tablet    Other Relevant Orders    X-Ray Calcaneus 2 View Left          The patient's Health Maintenance was reviewed and the following appears to be due:   Health Maintenance Due   Topic Date Due    Hepatitis C Screening  Never done    Cervical Cancer Screening  Never done    HIV Screening  Never done       (PHQ-2 data if performed)  Depression Patient Health Questionnaire 2022   Over the last two weeks how often have you been bothered by little interest or pleasure in doing things Not at all   Over the last two weeks how often have you been bothered by feeling down, depressed or hopeless Not at all   PHQ-2 Total Score 0     (PHQ-9 data if performed)  No flowsheet data found.  (MARIO data if performed)  No flowsheet data found.     Past Medical History:  Past Medical History:   Diagnosis Date    AC (acromioclavicular) arthritis     Hyperlipidemia     Hypertension      Past Surgical History:   Procedure Laterality Date     SECTION       Review of patient's allergies indicates:  No Known Allergies  Current Outpatient Medications on File Prior to Visit   Medication Sig Dispense Refill    gentian violet 1 % topical solution Apply 1 mL topically daily as needed.      streptomycin 1 gram injection Inject 1 g into the muscle twice a week.      [DISCONTINUED] amLODIPine (NORVASC) 5 MG tablet Take 1 tablet by mouth once daily.      [DISCONTINUED] meloxicam (MOBIC) 15 MG tablet Take 15 mg by mouth once daily.       No current facility-administered medications on file prior to visit.     Social History     Socioeconomic History    Marital status: Single   Tobacco Use    Smoking status: Never Smoker    Smokeless tobacco: Never Used  "  Substance and Sexual Activity    Alcohol use: Yes     Comment: Ocassionally    Drug use: Never    Sexual activity: Yes     History reviewed. No pertinent family history.    Review of Systems   Constitutional: Negative for chills, fatigue and fever.   HENT: Negative for congestion, hearing loss, rhinorrhea, sore throat and voice change.    Eyes: Negative for visual disturbance.   Respiratory: Negative for cough, shortness of breath and wheezing.    Cardiovascular: Negative for chest pain, palpitations and leg swelling.   Gastrointestinal: Negative for abdominal pain, blood in stool, constipation, diarrhea, nausea and vomiting.   Endocrine: Negative for cold intolerance, heat intolerance, polydipsia, polyphagia and polyuria.   Genitourinary: Negative for decreased urine volume, difficulty urinating, flank pain, frequency, hematuria and urgency.   Musculoskeletal: Negative for arthralgias, gait problem, joint swelling and myalgias.        Left heel pain   Skin: Positive for rash (tinea pedis).   Neurological: Negative for dizziness, seizures, speech difficulty, weakness, numbness and headaches.   Hematological: Negative for adenopathy.   Psychiatric/Behavioral: Negative.  Negative for decreased concentration, dysphoric mood, hallucinations and suicidal ideas. The patient is not nervous/anxious.    All other systems reviewed and are negative.      Objective:   /81   Pulse 85   Temp 98.4 °F (36.9 °C) (Oral)   Resp 18   Ht 4' 11" (1.499 m)   Wt 92.5 kg (204 lb)   SpO2 100%   BMI 41.20 kg/m²   Physical Exam  Vitals and nursing note reviewed.   Constitutional:       General: She is not in acute distress.     Appearance: Normal appearance. She is normal weight. She is not ill-appearing.   HENT:      Head: Normocephalic and atraumatic.      Right Ear: Tympanic membrane, ear canal and external ear normal.      Left Ear: Tympanic membrane, ear canal and external ear normal.      Nose: Nose normal.      " Mouth/Throat:      Mouth: Mucous membranes are moist.   Eyes:      Extraocular Movements: Extraocular movements intact.      Conjunctiva/sclera: Conjunctivae normal.      Pupils: Pupils are equal, round, and reactive to light.   Cardiovascular:      Rate and Rhythm: Normal rate and regular rhythm.      Pulses: Normal pulses.      Heart sounds: Normal heart sounds. No murmur heard.    No friction rub. No gallop.   Pulmonary:      Effort: Pulmonary effort is normal. No respiratory distress.      Breath sounds: Normal breath sounds. No wheezing, rhonchi or rales.   Abdominal:      General: Abdomen is flat. Bowel sounds are normal.      Palpations: Abdomen is soft. There is no mass.      Tenderness: There is no abdominal tenderness. There is no guarding or rebound.      Hernia: No hernia is present.   Musculoskeletal:         General: Tenderness (TTP left heel) present. No swelling. Normal range of motion.      Cervical back: Normal range of motion and neck supple.      Right lower leg: No edema.      Left lower leg: No edema.   Skin:     General: Skin is warm and dry.      Capillary Refill: Capillary refill takes less than 2 seconds.      Findings: Lesion and rash (skin of 4th and 5th toes macerated with fungus present (whitish) and genian violet present on skin.) present.   Neurological:      General: No focal deficit present.      Mental Status: She is alert and oriented to person, place, and time. Mental status is at baseline.      Cranial Nerves: No cranial nerve deficit.      Sensory: No sensory deficit.      Gait: Gait normal.   Psychiatric:         Mood and Affect: Mood normal.         Behavior: Behavior normal.         Thought Content: Thought content normal.         Judgment: Judgment normal.          Lab Visit on 07/06/2022   Component Date Value Ref Range Status    Sodium Level 07/06/2022 138  136 - 145 mmol/L Final    Potassium Level 07/06/2022 4.2  3.5 - 5.1 mmol/L Final    Chloride 07/06/2022 104  98 -  107 mmol/L Final    Carbon Dioxide 07/06/2022 23  22 - 29 mmol/L Final    Glucose Level 07/06/2022 104 (A) 74 - 100 mg/dL Final    Blood Urea Nitrogen 07/06/2022 9.4  7.0 - 18.7 mg/dL Final    Creatinine 07/06/2022 0.69  0.55 - 1.02 mg/dL Final    Calcium Level Total 07/06/2022 9.5  8.4 - 10.2 mg/dL Final    Protein Total 07/06/2022 7.4  6.4 - 8.3 gm/dL Final    Albumin Level 07/06/2022 3.8  3.5 - 5.0 gm/dL Final    Globulin 07/06/2022 3.6 (A) 2.4 - 3.5 gm/dL Final    Albumin/Globulin Ratio 07/06/2022 1.1  1.1 - 2.0 ratio Final    Bilirubin Total 07/06/2022 0.3  <=1.5 mg/dL Final    Alkaline Phosphatase 07/06/2022 120  40 - 150 unit/L Final    Alanine Aminotransferase 07/06/2022 14  0 - 55 unit/L Final    Aspartate Aminotransferase 07/06/2022 18  5 - 34 unit/L Final    Estimated GFR- 07/06/2022 >60  mls/min/1.73/m2 Final    Thyroid Stimulating Hormone 07/06/2022 0.6655  0.3500 - 4.9400 uIU/mL Final    Cholesterol Total 07/06/2022 190  <=200 mg/dL Final    HDL Cholesterol 07/06/2022 42  35 - 60 mg/dL Final    Triglyceride 07/06/2022 103  37 - 140 mg/dL Final    Cholesterol/HDL Ratio 07/06/2022 5  0 - 5 Final    Very Low Density Lipoprotein 07/06/2022 21   Final    LDL Cholesterol 07/06/2022 127.00  50.00 - 140.00 mg/dL Final    Hemoglobin A1c 07/06/2022 5.7  <=7.0 % Final    Estimated Average Glucose 07/06/2022 116.9  mg/dL Final    WBC 07/06/2022 10.0  4.5 - 11.5 x10(3)/mcL Final    RBC 07/06/2022 4.56  4.20 - 5.40 x10(6)/mcL Final    Hgb 07/06/2022 12.7  12.0 - 16.0 gm/dL Final    Hct 07/06/2022 39.2  37.0 - 47.0 % Final    MCV 07/06/2022 86.0  80.0 - 94.0 fL Final    MCH 07/06/2022 27.9  27.0 - 31.0 pg Final    MCHC 07/06/2022 32.4 (A) 33.0 - 36.0 mg/dL Final    RDW 07/06/2022 12.9  11.5 - 17.0 % Final    Platelet 07/06/2022 381  130 - 400 x10(3)/mcL Final    MPV 07/06/2022 9.9  7.4 - 10.4 fL Final    Neut % 07/06/2022 64.2  % Final    Lymph % 07/06/2022 25.6  %  Final    Mono % 07/06/2022 6.0  % Final    Eos % 07/06/2022 3.3  % Final    Basophil % 07/06/2022 0.6  % Final    Lymph # 07/06/2022 2.55  0.6 - 4.6 x10(3)/mcL Final    Neut # 07/06/2022 6.4  2.1 - 9.2 x10(3)/mcL Final    Mono # 07/06/2022 0.60  0.1 - 1.3 x10(3)/mcL Final    Eos # 07/06/2022 0.33  0 - 0.9 x10(3)/mcL Final    Baso # 07/06/2022 0.06  0 - 0.2 x10(3)/mcL Final    IG# 07/06/2022 0.03  0 - 0.04 x10(3)/mcL Final    IG% 07/06/2022 0.3  % Final    NRBC% 07/06/2022 0.0  % Final      Assessment:     1. Tinea pedis of both feet    2. Chronic heel pain, left    3. Mixed hyperlipidemia    4. Prediabetes    5. Primary hypertension      Plan:   I have changed Shani Suarez's amLODIPine and meloxicam. I am also having her start on lancets, blood sugar diagnostic, blood-glucose meter, and metFORMIN. Additionally, I am having her maintain her gentian violet and streptomycin.  Problem List Items Addressed This Visit        Derm    Tinea pedis - Primary    Relevant Orders    Ambulatory referral/consult to Wound Clinic    Ambulatory referral/consult to Podiatry       Cardiac/Vascular    Hypertension    Relevant Medications    amLODIPine (NORVASC) 5 MG tablet    Hyperlipidemia       Endocrine    Prediabetes    Relevant Medications    lancets Misc    blood sugar diagnostic Strp    blood-glucose meter kit    metFORMIN (GLUCOPHAGE) 500 MG tablet    Other Relevant Orders    Ambulatory referral/consult to Wound Clinic    Ambulatory referral/consult to Podiatry       Orthopedic    Chronic heel pain, left    Relevant Medications    meloxicam (MOBIC) 15 MG tablet    Other Relevant Orders    X-Ray Calcaneus 2 View Left        Follow up in about 6 weeks (around 8/17/2022) for prediabetes and tinea pedis.      Medication List with Changes/Refills   New Medications    BLOOD SUGAR DIAGNOSTIC STRP    To check BG once daily, to use with insurance preferred meter    BLOOD-GLUCOSE METER KIT    To check BG daily, to use  with insurance preferred meter    LANCETS MISC    To check BG daily , to use with insurance preferred meter    METFORMIN (GLUCOPHAGE) 500 MG TABLET    Take 1 tablet (500 mg total) by mouth daily with breakfast.   Current Medications    GENTIAN VIOLET 1 % TOPICAL SOLUTION    Apply 1 mL topically daily as needed.    STREPTOMYCIN 1 GRAM INJECTION    Inject 1 g into the muscle twice a week.   Changed and/or Refilled Medications    Modified Medication Previous Medication    AMLODIPINE (NORVASC) 5 MG TABLET amLODIPine (NORVASC) 5 MG tablet       Take 1 tablet (5 mg total) by mouth once daily.    Take 1 tablet by mouth once daily.    MELOXICAM (MOBIC) 15 MG TABLET meloxicam (MOBIC) 15 MG tablet       Take 1 tablet (15 mg total) by mouth once daily. With food and plenty of water (8 ounces)    Take 15 mg by mouth once daily.            Orders Placed This Encounter   Procedures    X-Ray Calcaneus 2 View Left     Standing Status:   Future     Standing Expiration Date:   7/6/2023     Order Specific Question:   May the Radiologist modify the order per protocol to meet the clinical needs of the patient?     Answer:   Yes     Order Specific Question:   Release to patient     Answer:   Immediate    Ambulatory referral/consult to Wound Clinic     Standing Status:   Future     Standing Expiration Date:   8/6/2023     Referral Priority:   Routine     Referral Type:   Consultation     Referral Reason:   Specialty Services Required     Referred to Provider:   SAMANTHA Lockwood     Requested Specialty:   Wound Care     Number of Visits Requested:   1    Ambulatory referral/consult to Podiatry     Standing Status:   Future     Standing Expiration Date:   8/6/2023     Referral Priority:   Routine     Referral Type:   Consultation     Referral Reason:   Specialty Services Required     Referred to Provider:   Pancho Silveira Jr. [id number]     Requested Specialty:   Podiatry     Number of Visits Requested:   1   Previous medical  history/lab work/radiology reviewed and considered during medical management decisions.   Medication list reviewed and medication reconciliation performed.  Patient was provided  and care about his/her current diagnosis (es) and medications including risk/benefit and side effects/adverse events, over the counter medication uses/doses, home self-care and contact precautions,  and red flags and indications for when to seek immediate medical attention.   Patient was advised to continue compliance with current medication list and medical recommendations.  Recommended/ Advised continued compliance with recommended eating habits/ diets for medical conditions and exercise 150 minutes/ week (if possible) for medical condition (s).  Educational handouts and instructions on selected disease management in AVS (After Visit Summary).  All of the patient's questions were answered to patient's satisfaction.   The patient was receptive, expressed verbal understanding and agreement the above plan.    This note was created with the assistance of  M*Modal Fluency Direct voice recognition or  phone dictation. Please excuse any typographical errors that might have transpired. There may be transcription errors as a result of using this technology, however minimal effort has been made to assure accuracy of transcription, but any obvious errors or omissions should be clarified with the author of the document    Follow up with  administrative management referral to Internal Medicine or Family Medicine (as of 7/8/2022) for  medical conditions above in 6 wks    Radha Lewis MD

## 2022-07-11 ENCOUNTER — HOSPITAL ENCOUNTER (OUTPATIENT)
Dept: RADIOLOGY | Facility: HOSPITAL | Age: 32
Discharge: HOME | End: 2022-07-11
Attending: FAMILY MEDICINE
Payer: MEDICAID

## 2022-07-11 DIAGNOSIS — G89.29 CHRONIC HEEL PAIN, LEFT: ICD-10-CM

## 2022-07-11 DIAGNOSIS — M79.672 CHRONIC HEEL PAIN, LEFT: ICD-10-CM

## 2022-07-11 PROCEDURE — 73650 X-RAY EXAM OF HEEL: CPT | Mod: TC,LT

## 2022-07-19 ENCOUNTER — OFFICE VISIT (OUTPATIENT)
Dept: URGENT CARE | Facility: CLINIC | Age: 32
End: 2022-07-19
Payer: MEDICAID

## 2022-07-19 VITALS
BODY MASS INDEX: 43.84 KG/M2 | OXYGEN SATURATION: 100 % | HEART RATE: 75 BPM | DIASTOLIC BLOOD PRESSURE: 74 MMHG | TEMPERATURE: 99 F | RESPIRATION RATE: 18 BRPM | HEIGHT: 57 IN | SYSTOLIC BLOOD PRESSURE: 114 MMHG | WEIGHT: 203.19 LBS

## 2022-07-19 DIAGNOSIS — Z11.52 ENCOUNTER FOR SCREENING FOR SEVERE ACUTE RESPIRATORY SYNDROME CORONAVIRUS 2 (SARS-COV-2) INFECTION: ICD-10-CM

## 2022-07-19 DIAGNOSIS — R52 BODY ACHES: ICD-10-CM

## 2022-07-19 DIAGNOSIS — R09.81 NASAL CONGESTION: ICD-10-CM

## 2022-07-19 DIAGNOSIS — J01.00 ACUTE MAXILLARY SINUSITIS, RECURRENCE NOT SPECIFIED: Primary | ICD-10-CM

## 2022-07-19 LAB
CTP QC/QA: YES
CTP QC/QA: YES
FLUAV AG NPH QL: NEGATIVE
FLUBV AG NPH QL: NEGATIVE
SARS-COV-2 RDRP RESP QL NAA+PROBE: NEGATIVE

## 2022-07-19 PROCEDURE — 99215 OFFICE O/P EST HI 40 MIN: CPT | Mod: PBBFAC | Performed by: NURSE PRACTITIONER

## 2022-07-19 PROCEDURE — 99213 PR OFFICE/OUTPT VISIT, EST, LEVL III, 20-29 MIN: ICD-10-PCS | Mod: S$PBB,,, | Performed by: NURSE PRACTITIONER

## 2022-07-19 PROCEDURE — 99213 OFFICE O/P EST LOW 20 MIN: CPT | Mod: S$PBB,,, | Performed by: NURSE PRACTITIONER

## 2022-07-19 PROCEDURE — U0002 COVID-19 LAB TEST NON-CDC: HCPCS | Mod: PBBFAC | Performed by: NURSE PRACTITIONER

## 2022-07-19 PROCEDURE — 87804 INFLUENZA ASSAY W/OPTIC: CPT | Mod: PBBFAC | Performed by: NURSE PRACTITIONER

## 2022-07-19 RX ORDER — DEXAMETHASONE SODIUM PHOSPHATE 100 MG/10ML
8 INJECTION INTRAMUSCULAR; INTRAVENOUS
Status: COMPLETED | OUTPATIENT
Start: 2022-07-19 | End: 2022-07-19

## 2022-07-19 RX ORDER — AMOXICILLIN 875 MG/1
875 TABLET, FILM COATED ORAL 2 TIMES DAILY
Qty: 20 TABLET | Refills: 0 | Status: SHIPPED | OUTPATIENT
Start: 2022-07-19 | End: 2022-07-29

## 2022-07-19 RX ADMIN — DEXAMETHASONE SODIUM PHOSPHATE 8 MG: 100 INJECTION INTRAMUSCULAR; INTRAVENOUS at 11:07

## 2022-07-19 NOTE — PROGRESS NOTES
"Subjective:       Patient ID: Shani Suarez is a 32 y.o. female.    Vitals:  height is 4' 9" (1.448 m) and weight is 92.2 kg (203 lb 3.2 oz). Her oral temperature is 99 °F (37.2 °C). Her blood pressure is 114/74 and her pulse is 75. Her respiration is 18 and oxygen saturation is 100%.     Chief Complaint: Nasal Congestion (Headache/ body aches - Entered by patient)    Patient is a 32-year-old female, here today for nasal congestion, headache, body aches over the past 6 days.  Patient states she tried over-the-counter medication, Tylenol with little relief.      Constitution: Positive for fatigue.   HENT: Positive for congestion and sinus pain.    Neck: neck negative.   Cardiovascular: Negative.    Respiratory: Negative.        Objective:      Physical Exam   Constitutional: She is oriented to person, place, and time. She appears well-developed.   HENT:   Head: Normocephalic.   Ears:   Right Ear: Tympanic membrane normal.   Left Ear: Tympanic membrane normal.   Nose: Congestion present. Right sinus exhibits maxillary sinus tenderness. Left sinus exhibits maxillary sinus tenderness.   Eyes: Conjunctivae and EOM are normal. Pupils are equal, round, and reactive to light.   Neck: Neck supple.   Cardiovascular: Normal rate, regular rhythm and normal heart sounds.   Pulmonary/Chest: Effort normal and breath sounds normal.   Musculoskeletal: Normal range of motion.         General: Normal range of motion.   Neurological: She is alert and oriented to person, place, and time.   Skin: Skin is warm and dry. Capillary refill takes less than 2 seconds.   Psychiatric: Her behavior is normal.   Vitals reviewed.        Assessment:       1. Acute maxillary sinusitis, recurrence not specified    2. Encounter for screening for severe acute respiratory syndrome coronavirus 2 (SARS-CoV-2) infection    3. Nasal congestion    4. Body aches            Office Visit on 07/19/2022   Component Date Value Ref Range Status    POC Rapid " COVID 07/19/2022 Negative  Negative Final     Acceptable 07/19/2022 Yes   Final    Rapid Influenza A Ag 07/19/2022 Negative  Negative Final    Rapid Influenza B Ag 07/19/2022 Negative  Negative Final     Acceptable 07/19/2022 Yes   Final     Plan:         Decadron 8mg IM in office.   Medication as ordered. May use humidifier.  If any shortness of breath, wheezing, continued fevers or any new symptoms then immediately go to ER.    Acute maxillary sinusitis, recurrence not specified  -     dexamethasone injection 8 mg  -     amoxicillin (AMOXIL) 875 MG tablet; Take 1 tablet (875 mg total) by mouth 2 (two) times daily. for 10 days  Dispense: 20 tablet; Refill: 0    Encounter for screening for severe acute respiratory syndrome coronavirus 2 (SARS-CoV-2) infection  -     POCT COVID-19 Rapid Screening  -     POCT Influenza A/B    Nasal congestion  -     POCT COVID-19 Rapid Screening  -     POCT Influenza A/B    Body aches  -     POCT COVID-19 Rapid Screening  -     POCT Influenza A/B

## 2022-08-22 ENCOUNTER — OFFICE VISIT (OUTPATIENT)
Dept: INTERNAL MEDICINE | Facility: CLINIC | Age: 32
End: 2022-08-22

## 2022-08-22 VITALS
RESPIRATION RATE: 18 BRPM | HEIGHT: 57 IN | WEIGHT: 202.38 LBS | TEMPERATURE: 99 F | SYSTOLIC BLOOD PRESSURE: 120 MMHG | BODY MASS INDEX: 43.66 KG/M2 | OXYGEN SATURATION: 100 % | HEART RATE: 81 BPM | DIASTOLIC BLOOD PRESSURE: 79 MMHG

## 2022-08-22 DIAGNOSIS — I10 PRIMARY HYPERTENSION: ICD-10-CM

## 2022-08-22 DIAGNOSIS — M79.672 CHRONIC HEEL PAIN, LEFT: ICD-10-CM

## 2022-08-22 DIAGNOSIS — G89.29 CHRONIC HEEL PAIN, LEFT: ICD-10-CM

## 2022-08-22 DIAGNOSIS — M79.671 RIGHT FOOT PAIN: Primary | ICD-10-CM

## 2022-08-22 DIAGNOSIS — Z13.29 SCREENING FOR THYROID DISORDER: ICD-10-CM

## 2022-08-22 DIAGNOSIS — Z11.59 SCREENING FOR VIRAL DISEASE: ICD-10-CM

## 2022-08-22 DIAGNOSIS — Z11.3 ROUTINE SCREENING FOR STI (SEXUALLY TRANSMITTED INFECTION): ICD-10-CM

## 2022-08-22 DIAGNOSIS — R73.03 PREDIABETES: ICD-10-CM

## 2022-08-22 DIAGNOSIS — Z12.4 CERVICAL CANCER SCREENING: ICD-10-CM

## 2022-08-22 DIAGNOSIS — B35.3 TINEA PEDIS OF BOTH FEET: ICD-10-CM

## 2022-08-22 DIAGNOSIS — E78.2 MIXED HYPERLIPIDEMIA: ICD-10-CM

## 2022-08-22 DIAGNOSIS — Z13.21 ENCOUNTER FOR VITAMIN DEFICIENCY SCREENING: ICD-10-CM

## 2022-08-22 DIAGNOSIS — Z11.4 SCREENING FOR HIV (HUMAN IMMUNODEFICIENCY VIRUS): ICD-10-CM

## 2022-08-22 DIAGNOSIS — Z13.0 SCREENING FOR IRON DEFICIENCY ANEMIA: ICD-10-CM

## 2022-08-22 PROCEDURE — 99214 PR OFFICE/OUTPT VISIT, EST, LEVL IV, 30-39 MIN: ICD-10-PCS | Mod: S$PBB,,,

## 2022-08-22 PROCEDURE — 99214 OFFICE O/P EST MOD 30 MIN: CPT | Mod: S$PBB,,,

## 2022-08-22 NOTE — PROGRESS NOTES
"Subjective:       Patient ID: Shani Suarez is a 32 y.o. female.    Chief Complaint: Establish Care and Foot Pain (Right)    HPI   Shani Suarez is a 32 y.o. Black female presenting in clinic today to Establish Care and Foot Pain (Right) Previous PCP: Dr. Radha Lewis. PMH: HTN, chronic tinea pedis, chronic left calcaneous pain, and prediabetes. She is currently followed by [facility] wound care. She has a pending referral to Dr. Silveira. She states medicaid insurance is inactive. She will go to [place] [id number] to reapply.    She was a "no show" for wound care appt on 7/29/2022. Provided patient with number for central scheduling to re-scheduled. Still having pain in left heel. She says she is having pain in the right heel as well. She states she previously had an xray on right foot, but I do not see any results in Epic or [employer]. She says the right foot pain has been ongoing for approximately one year. She denies numbness or tingling. She says she works on her feet.    She states she checks her blood sugar in the morning and in the afternoon. She says in the mornings it is sometimes "high." She did not present a log.   Denies smoking or illicit drug use. She drinks alcohol occasionally. Denies chest pain, shortness of breath, cough, headache, dizziness, weakness, abdominal pain, nausea, vomiting, diarrhea, constipation, dysuria, depression, anxiety, SI, and HI.    Cervical Cancer Screening - Last Pap in 2018. GYN referral placed (8/22/2022).  Breast Cancer Screening - Deferred due to age. Will initiate testing at age 40.  Colon Cancer Screening - Deferred due to age. Will initiate testing at age 45.  Vaccinations: Flu - 10/21/2021 / Tetanus - 3/1/2018 /COVID - 12/9/2020, 1/19/2021, 10/19/2021         Review of Systems   Constitutional: Negative.    HENT: Negative.    Eyes: Negative.    Respiratory: Negative.    Cardiovascular: Negative.    Gastrointestinal: Negative.    Endocrine: Negative.  "   Genitourinary: Negative.    Musculoskeletal: Positive for gait problem.        Bilat foot pain   Integumentary:  Negative.   Allergic/Immunologic: Negative.    Hematological: Negative.    Psychiatric/Behavioral: Negative.    All other systems reviewed and are negative.        Objective:      Physical Exam  Constitutional:       Appearance: Normal appearance. She is normal weight.   HENT:      Head: Normocephalic and atraumatic.      Right Ear: External ear normal.      Left Ear: External ear normal.      Nose: Nose normal.      Mouth/Throat:      Mouth: Mucous membranes are moist.      Pharynx: Oropharynx is clear.   Eyes:      Extraocular Movements: Extraocular movements intact.      Conjunctiva/sclera: Conjunctivae normal.      Pupils: Pupils are equal, round, and reactive to light.   Cardiovascular:      Rate and Rhythm: Normal rate and regular rhythm.      Pulses: Normal pulses.      Heart sounds: Normal heart sounds.   Pulmonary:      Effort: Pulmonary effort is normal.      Breath sounds: Normal breath sounds.   Abdominal:      General: Bowel sounds are normal.      Palpations: Abdomen is soft.   Musculoskeletal:         General: Normal range of motion.      Cervical back: Normal range of motion and neck supple.   Skin:     General: Skin is warm and dry.      Capillary Refill: Capillary refill takes less than 2 seconds.   Neurological:      General: No focal deficit present.      Mental Status: She is alert and oriented to person, place, and time.   Psychiatric:         Mood and Affect: Mood normal.         Behavior: Behavior normal.         Thought Content: Thought content normal.         Judgment: Judgment normal.         Assessment:       Problem List Items Addressed This Visit        Derm    Tinea pedis     Pending referral to Dr. Silveira.  Call central scheduling to reschedule wound care appt.              Cardiac/Vascular    Hyperlipidemia    Relevant Orders    Lipid Panel    Hypertension     BP-  120/79 - at goal.  Follow a low sodium (less than 2 grams of sodium per day), DASH diet.   Continue amlodipine as prescribed.  Monitor blood pressure and report any consistent values greater than 140/90 and keep a log.   Maintain healthy weight with a BMI goal of <30.   Aerobic exercise for 150 minutes per week (or 5 days a week for 30 minutes each day).             Relevant Orders    CBC Auto Differential    Comprehensive Metabolic Panel    Microalbumin/Creatinine Ratio, Urine    Urinalysis, Reflex to Urine Culture Urine, Clean Catch       Renal/    Cervical cancer screening     Referral to [facility] GYN.             Relevant Orders    Ambulatory referral/consult to Gynecology       Endocrine    Prediabetes    Relevant Orders    Comprehensive Metabolic Panel    Hemoglobin A1C    BMI 40.0-44.9, adult     Body mass index is 43.79 kg/m².  Goal BMI <30.  Aerobic exercise 150 minutes per week.  Avoid soda, simple sugars, sweets, excessive rice, pasta, potatoes or bread.   Choose brown options when available and portion control.  Limit fast foods and fried foods.   Choose complex carbs in moderation (ex: green, leafy vegetables, beans, oatmeal).  Eat plenty of fresh fruits and vegetables with lean meats daily.   Consider permanent healthy lifestyle changes.                Orthopedic    Chronic heel pain, left     Take meloxicam prn.  Pending referral to Dr. Silveira.            Right foot pain - Primary     Xray right foot.  Take meloxicam prn.  Wear compression socks while working.             Other Visit Diagnoses     Screening for viral disease        Relevant Orders    Hepatitis Panel, Acute    Screening for thyroid disorder        Relevant Orders    T4, Free    TSH    Encounter for vitamin deficiency screening        Relevant Orders    Vitamin D    Routine screening for STI (sexually transmitted infection)        Relevant Orders    Chlamydia/GC, PCR    SYPHILIS ANTIBODY (WITH REFLEX RPR)    Screening for iron deficiency  anemia        Relevant Orders    Ferritin    Iron and TIBC    Screening for HIV (human immunodeficiency virus)        Relevant Orders    HIV 1/2 Ag/Ab (4th Gen)            Virtual visit in 3 months for lab review.  RTC prn.  Labs within a week of visit.    JOLENE Millan  8/22/22

## 2022-08-22 NOTE — ASSESSMENT & PLAN NOTE
BP- 120/79 - at goal.  Follow a low sodium (less than 2 grams of sodium per day), DASH diet.   Continue amlodipine as prescribed.  Monitor blood pressure and report any consistent values greater than 140/90 and keep a log.   Maintain healthy weight with a BMI goal of <30.   Aerobic exercise for 150 minutes per week (or 5 days a week for 30 minutes each day).

## 2022-08-22 NOTE — ASSESSMENT & PLAN NOTE
Body mass index is 43.79 kg/m².  Goal BMI <30.  Aerobic exercise 150 minutes per week.  Avoid soda, simple sugars, sweets, excessive rice, pasta, potatoes or bread.   Choose brown options when available and portion control.  Limit fast foods and fried foods.   Choose complex carbs in moderation (ex: green, leafy vegetables, beans, oatmeal).  Eat plenty of fresh fruits and vegetables with lean meats daily.   Consider permanent healthy lifestyle changes.

## 2022-09-21 ENCOUNTER — OFFICE VISIT (OUTPATIENT)
Dept: INTERNAL MEDICINE | Facility: CLINIC | Age: 32
End: 2022-09-21
Payer: MEDICAID

## 2022-09-21 VITALS
RESPIRATION RATE: 18 BRPM | HEART RATE: 77 BPM | HEIGHT: 57 IN | WEIGHT: 199.63 LBS | TEMPERATURE: 99 F | BODY MASS INDEX: 43.07 KG/M2 | SYSTOLIC BLOOD PRESSURE: 135 MMHG | DIASTOLIC BLOOD PRESSURE: 91 MMHG | OXYGEN SATURATION: 100 %

## 2022-09-21 DIAGNOSIS — R42 VERTIGO: ICD-10-CM

## 2022-09-21 DIAGNOSIS — G43.009 MIGRAINE WITHOUT AURA AND WITHOUT STATUS MIGRAINOSUS, NOT INTRACTABLE: ICD-10-CM

## 2022-09-21 DIAGNOSIS — H60.533 ACUTE CONTACT OTITIS EXTERNA OF BOTH EARS: ICD-10-CM

## 2022-09-21 DIAGNOSIS — Z23 IMMUNIZATION DUE: ICD-10-CM

## 2022-09-21 DIAGNOSIS — I10 PRIMARY HYPERTENSION: Primary | ICD-10-CM

## 2022-09-21 PROBLEM — J03.00 STREPTOCOCCAL TONSILLITIS: Status: RESOLVED | Noted: 2022-05-25 | Resolved: 2022-09-21

## 2022-09-21 PROCEDURE — 99215 OFFICE O/P EST HI 40 MIN: CPT | Mod: PBBFAC

## 2022-09-21 PROCEDURE — 3075F PR MOST RECENT SYSTOLIC BLOOD PRESS GE 130-139MM HG: ICD-10-PCS | Mod: CPTII,,,

## 2022-09-21 PROCEDURE — 1159F MED LIST DOCD IN RCRD: CPT | Mod: CPTII,,,

## 2022-09-21 PROCEDURE — 99214 OFFICE O/P EST MOD 30 MIN: CPT | Mod: S$PBB,,,

## 2022-09-21 PROCEDURE — 99214 PR OFFICE/OUTPT VISIT, EST, LEVL IV, 30-39 MIN: ICD-10-PCS | Mod: S$PBB,,,

## 2022-09-21 PROCEDURE — 3008F BODY MASS INDEX DOCD: CPT | Mod: CPTII,,,

## 2022-09-21 PROCEDURE — 3075F SYST BP GE 130 - 139MM HG: CPT | Mod: CPTII,,,

## 2022-09-21 PROCEDURE — 3008F PR BODY MASS INDEX (BMI) DOCUMENTED: ICD-10-PCS | Mod: CPTII,,,

## 2022-09-21 PROCEDURE — 3080F DIAST BP >= 90 MM HG: CPT | Mod: CPTII,,,

## 2022-09-21 PROCEDURE — 1159F PR MEDICATION LIST DOCUMENTED IN MEDICAL RECORD: ICD-10-PCS | Mod: CPTII,,,

## 2022-09-21 PROCEDURE — 3080F PR MOST RECENT DIASTOLIC BLOOD PRESSURE >= 90 MM HG: ICD-10-PCS | Mod: CPTII,,,

## 2022-09-21 PROCEDURE — 1160F RVW MEDS BY RX/DR IN RCRD: CPT | Mod: CPTII,,,

## 2022-09-21 PROCEDURE — 1160F PR REVIEW ALL MEDS BY PRESCRIBER/CLIN PHARMACIST DOCUMENTED: ICD-10-PCS | Mod: CPTII,,,

## 2022-09-21 PROCEDURE — 90686 IIV4 VACC NO PRSV 0.5 ML IM: CPT | Mod: PBBFAC

## 2022-09-21 RX ORDER — SUMATRIPTAN SUCCINATE 25 MG/1
25 TABLET ORAL
Qty: 30 TABLET | Refills: 1 | Status: SHIPPED | OUTPATIENT
Start: 2022-09-21 | End: 2024-03-13

## 2022-09-21 RX ORDER — CIPROFLOXACIN 0.5 MG/.25ML
4 SOLUTION/ DROPS AURICULAR (OTIC) 2 TIMES DAILY
Qty: 20 EACH | Refills: 0 | Status: SHIPPED | OUTPATIENT
Start: 2022-09-21 | End: 2022-10-01

## 2022-09-21 RX ORDER — MECLIZINE HCL 12.5 MG 12.5 MG/1
12.5 TABLET ORAL 3 TIMES DAILY PRN
Qty: 45 TABLET | Refills: 1 | Status: SHIPPED | OUTPATIENT
Start: 2022-09-21 | End: 2023-04-26 | Stop reason: SDUPTHER

## 2022-09-21 NOTE — ASSESSMENT & PLAN NOTE
Body mass index is 43.18 kg/m².  Goal BMI <30.  Aerobic exercise 150 minutes per week.  Avoid soda, simple sugars, sweets, excessive rice, pasta, potatoes or bread.   Choose brown options when available and portion control.  Limit fast foods and fried foods.   Choose complex carbs in moderation (ex: green, leafy vegetables, beans, oatmeal).  Eat plenty of fresh fruits and vegetables with lean meats daily.   Consider permanent healthy lifestyle changes.

## 2022-09-21 NOTE — PROGRESS NOTES
Subjective:       Patient ID: Shani Suarez is a 32 y.o. female.    Chief Complaint: Follow-up, Hypertension, and Headache    Hypertension  This is a new problem. The current episode started more than 1 month ago. The problem has been gradually worsening since onset. The problem is resistant. Associated symptoms include headaches. Pertinent negatives include no anxiety, blurred vision, chest pain, malaise/fatigue, neck pain, orthopnea, palpitations, peripheral edema, PND, shortness of breath or sweats. There are no associated agents to hypertension. Risk factors for coronary artery disease include diabetes mellitus, dyslipidemia, family history and obesity. Past treatments include nothing. Compliance problems include diet and exercise.      Shani Suarez is a 32 y.o. Black female presenting in clinic today to Follow-up, Hypertension, and Headache. PMH: HTN, chronic tinea pedis, chronic left calcaneous pain, and prediabetes. She is currently followed by [facility] wound care. She has a pending referral to Dr. Silveira.     She is currently having migraines with pain level of 8/10.  Migraines started 2 nights ago. She says when the migraine occurs, it lasts a few hours. Pain is associated with dizziness with pain behind ears, and pressure feeling in head. Pain is also associated with photosensitivity. She is currently squinting her eyes while in clinic. She denies any recent sinus problems. She has tried ibuprofen and motrin with some relief.     BP-135/91 - borderline. She states she took her BP meds this morning. She did not present a log. Denies smoking or illicit drug use. She drinks alcohol occasionally. Denies chest pain, shortness of breath, cough, headache, dizziness, weakness, abdominal pain, nausea, vomiting, diarrhea, constipation, dysuria, depression, anxiety, SI, and HI.     Cervical Cancer Screening - Last Pap in 2018. GYN appt: 2/1/2023.  Breast Cancer Screening - Deferred due to age. Will  initiate testing at age 40.  Colon Cancer Screening - Deferred due to age. Will initiate testing at age 45.  Vaccinations: Flu - 9/21/2022 / Tetanus - 3/1/2018 /COVID - 12/9/2020, 1/19/2021, 10/19/2021       Review of Systems   Constitutional: Negative.  Negative for malaise/fatigue.   HENT: Negative.     Eyes: Negative.  Negative for blurred vision.   Respiratory: Negative.  Negative for shortness of breath.    Cardiovascular: Negative.  Negative for chest pain, palpitations, orthopnea and PND.   Gastrointestinal: Negative.    Endocrine: Negative.    Genitourinary: Negative.    Musculoskeletal: Negative.  Negative for neck pain.   Integumentary:  Negative.   Allergic/Immunologic: Negative.    Neurological:  Positive for vertigo and headaches.   Hematological: Negative.    Psychiatric/Behavioral: Negative.     All other systems reviewed and are negative.      Objective:      Physical Exam  Vitals reviewed.   Constitutional:       Appearance: Normal appearance. She is normal weight.   HENT:      Head: Normocephalic and atraumatic.      Comments: Tenderness upon palpation occipital region.     Right Ear: Hearing, tympanic membrane and external ear normal.      Left Ear: Hearing, tympanic membrane and external ear normal.      Ears:      Comments: Erythematous canal     Nose: Nose normal.      Mouth/Throat:      Mouth: Mucous membranes are moist.      Pharynx: Oropharynx is clear.   Eyes:      Extraocular Movements: Extraocular movements intact.      Conjunctiva/sclera: Conjunctivae normal.      Pupils: Pupils are equal, round, and reactive to light.   Cardiovascular:      Rate and Rhythm: Normal rate and regular rhythm.      Pulses: Normal pulses.      Heart sounds: Normal heart sounds.   Pulmonary:      Effort: Pulmonary effort is normal.      Breath sounds: Normal breath sounds.   Abdominal:      General: Bowel sounds are normal.      Palpations: Abdomen is soft.   Musculoskeletal:         General: Normal range of  motion.      Cervical back: Normal range of motion and neck supple.   Skin:     General: Skin is warm and dry.      Capillary Refill: Capillary refill takes less than 2 seconds.   Neurological:      General: No focal deficit present.      Mental Status: She is alert and oriented to person, place, and time.   Psychiatric:         Mood and Affect: Mood normal.         Behavior: Behavior normal.         Thought Content: Thought content normal.         Judgment: Judgment normal.       Assessment and Plan:       Problem List Items Addressed This Visit          Neuro    Migraine without aura and without status migrainosus, not intractable     Rx sumatriptan.  Avoid triggers such as bright lights, alcohol, nicotine, aged cheeses, chocolate, caffeine, foods/drinks that contain nitrates or aspartame.   Take meds as prescribed.   Lie in a quiet, dark room if possible.   Limit stress and get at least 8 hours of sleep per night.            Relevant Medications    sumatriptan (IMITREX) 25 MG Tab       ENT    Vertigo     Rx meclizine.  Do not lie flat on your back. Prop yourself up slightly. This may reduce the spinning feeling. Keep your eyes open.  Move slowly so that you do not fall.  Take it exactly as directed.  Do not drive while you are having vertigo.           Relevant Medications    meclizine (ANTIVERT) 12.5 mg tablet    Acute contact otitis externa of both ears     Rx ciprofloxacin drops.  Erythema without drainage.         Relevant Medications    ciprofloxacin HCl 0.2 % otic solution       Cardiac/Vascular    Hypertension - Primary     BP- 135/91 - borderline  Follow a low sodium (less than 2 grams of sodium per day), DASH diet.   Continue amlodipine as prescribed.  Monitor blood pressure and report any consistent values greater than 140/90 and keep a log.  Maintain healthy weight with a BMI goal of <30.   Aerobic exercise for 150 minutes per week (or 5 days a week for 30 minutes each day).             ID     Immunization due     Flu vaccine today.          Relevant Orders    Influenza - Quadrivalent *Preferred* (6 months+) (PF) (Completed)       Endocrine    BMI 40.0-44.9, adult     Body mass index is 43.18 kg/m².  Goal BMI <30.  Aerobic exercise 150 minutes per week.  Avoid soda, simple sugars, sweets, excessive rice, pasta, potatoes or bread.   Choose brown options when available and portion control.  Limit fast foods and fried foods.   Choose complex carbs in moderation (ex: green, leafy vegetables, beans, oatmeal).  Eat plenty of fresh fruits and vegetables with lean meats daily.   Consider permanent healthy lifestyle changes.               RTC in 4 weeks to evaluate migraine.  RTC 1-2 weeks for worsening headaches.    JOLENE Millan  9/21/22

## 2022-09-21 NOTE — ASSESSMENT & PLAN NOTE
BP- 135/91 - borderline  Follow a low sodium (less than 2 grams of sodium per day), DASH diet.   Continue amlodipine as prescribed.  Monitor blood pressure and report any consistent values greater than 140/90 and keep a log.  Maintain healthy weight with a BMI goal of <30.   Aerobic exercise for 150 minutes per week (or 5 days a week for 30 minutes each day).

## 2022-09-22 ENCOUNTER — PATIENT MESSAGE (OUTPATIENT)
Dept: INTERNAL MEDICINE | Facility: CLINIC | Age: 32
End: 2022-09-22
Payer: MEDICAID

## 2022-10-17 ENCOUNTER — LAB VISIT (OUTPATIENT)
Dept: LAB | Facility: HOSPITAL | Age: 32
End: 2022-10-17
Payer: MEDICAID

## 2022-10-17 DIAGNOSIS — I10 PRIMARY HYPERTENSION: ICD-10-CM

## 2022-10-17 DIAGNOSIS — E78.2 MIXED HYPERLIPIDEMIA: ICD-10-CM

## 2022-10-17 DIAGNOSIS — Z13.21 ENCOUNTER FOR VITAMIN DEFICIENCY SCREENING: ICD-10-CM

## 2022-10-17 DIAGNOSIS — Z13.29 SCREENING FOR THYROID DISORDER: ICD-10-CM

## 2022-10-17 DIAGNOSIS — Z11.3 ROUTINE SCREENING FOR STI (SEXUALLY TRANSMITTED INFECTION): ICD-10-CM

## 2022-10-17 DIAGNOSIS — Z11.4 SCREENING FOR HIV (HUMAN IMMUNODEFICIENCY VIRUS): ICD-10-CM

## 2022-10-17 DIAGNOSIS — Z11.59 SCREENING FOR VIRAL DISEASE: ICD-10-CM

## 2022-10-17 DIAGNOSIS — Z13.0 SCREENING FOR IRON DEFICIENCY ANEMIA: ICD-10-CM

## 2022-10-17 DIAGNOSIS — R73.03 PREDIABETES: ICD-10-CM

## 2022-10-17 LAB
ALBUMIN SERPL-MCNC: 3.8 GM/DL (ref 3.5–5)
ALBUMIN/GLOB SERPL: 1.3 RATIO (ref 1.1–2)
ALP SERPL-CCNC: 119 UNIT/L (ref 40–150)
ALT SERPL-CCNC: 12 UNIT/L (ref 0–55)
APPEARANCE UR: CLEAR
AST SERPL-CCNC: 16 UNIT/L (ref 5–34)
BACTERIA #/AREA URNS AUTO: ABNORMAL /HPF
BASOPHILS # BLD AUTO: 0.06 X10(3)/MCL (ref 0–0.2)
BASOPHILS NFR BLD AUTO: 0.6 %
BILIRUB UR QL STRIP.AUTO: NEGATIVE MG/DL
BILIRUBIN DIRECT+TOT PNL SERPL-MCNC: 0.4 MG/DL
BUN SERPL-MCNC: 10.6 MG/DL (ref 7–18.7)
C TRACH DNA SPEC QL NAA+PROBE: NOT DETECTED
CALCIUM SERPL-MCNC: 9.2 MG/DL (ref 8.4–10.2)
CHLORIDE SERPL-SCNC: 105 MMOL/L (ref 98–107)
CHOLEST SERPL-MCNC: 182 MG/DL
CHOLEST/HDLC SERPL: 5 {RATIO} (ref 0–5)
CO2 SERPL-SCNC: 23 MMOL/L (ref 22–29)
COLOR UR AUTO: ABNORMAL
CREAT SERPL-MCNC: 0.63 MG/DL (ref 0.55–1.02)
CREAT UR-MCNC: 104.1 MG/DL (ref 47–110)
DEPRECATED CALCIDIOL+CALCIFEROL SERPL-MC: 10.8 NG/ML (ref 30–80)
EOSINOPHIL # BLD AUTO: 0.2 X10(3)/MCL (ref 0–0.9)
EOSINOPHIL NFR BLD AUTO: 2.1 %
ERYTHROCYTE [DISTWIDTH] IN BLOOD BY AUTOMATED COUNT: 12.3 % (ref 11.5–17)
EST. AVERAGE GLUCOSE BLD GHB EST-MCNC: 111.2 MG/DL
FERRITIN SERPL-MCNC: 91.98 NG/ML (ref 4.63–204)
GFR SERPLBLD CREATININE-BSD FMLA CKD-EPI: >60 MLS/MIN/1.73/M2
GLOBULIN SER-MCNC: 3 GM/DL (ref 2.4–3.5)
GLUCOSE SERPL-MCNC: 115 MG/DL (ref 74–100)
GLUCOSE UR QL STRIP.AUTO: NORMAL MG/DL
HAV IGM SERPL QL IA: NONREACTIVE
HBA1C MFR BLD: 5.5 %
HBV CORE IGM SERPL QL IA: NONREACTIVE
HBV SURFACE AG SERPL QL IA: NONREACTIVE
HCT VFR BLD AUTO: 35.2 % (ref 37–47)
HCV AB SERPL QL IA: NONREACTIVE
HDLC SERPL-MCNC: 40 MG/DL (ref 35–60)
HGB BLD-MCNC: 12.1 GM/DL (ref 12–16)
HIV 1+2 AB+HIV1 P24 AG SERPL QL IA: NONREACTIVE
HYALINE CASTS #/AREA URNS LPF: ABNORMAL /LPF
IMM GRANULOCYTES # BLD AUTO: 0.03 X10(3)/MCL (ref 0–0.04)
IMM GRANULOCYTES NFR BLD AUTO: 0.3 %
IRON SATN MFR SERPL: 26 % (ref 20–50)
IRON SERPL-MCNC: 90 UG/DL (ref 50–170)
KETONES UR QL STRIP.AUTO: NEGATIVE MG/DL
LDLC SERPL CALC-MCNC: 126 MG/DL (ref 50–140)
LEUKOCYTE ESTERASE UR QL STRIP.AUTO: NEGATIVE UNIT/L
LYMPHOCYTES # BLD AUTO: 2.58 X10(3)/MCL (ref 0.6–4.6)
LYMPHOCYTES NFR BLD AUTO: 27 %
MCH RBC QN AUTO: 28.9 PG (ref 27–31)
MCHC RBC AUTO-ENTMCNC: 34.4 MG/DL (ref 33–36)
MCV RBC AUTO: 84.2 FL (ref 80–94)
MICROALBUMIN UR-MCNC: 11.6 UG/ML
MICROALBUMIN/CREAT RATIO PNL UR: 11.1 MG/GM CR (ref 0–30)
MONOCYTES # BLD AUTO: 0.42 X10(3)/MCL (ref 0.1–1.3)
MONOCYTES NFR BLD AUTO: 4.4 %
MUCOUS THREADS URNS QL MICRO: ABNORMAL /LPF
N GONORRHOEA DNA SPEC QL NAA+PROBE: NOT DETECTED
NEUTROPHILS # BLD AUTO: 6.3 X10(3)/MCL (ref 2.1–9.2)
NEUTROPHILS NFR BLD AUTO: 65.6 %
NITRITE UR QL STRIP.AUTO: NEGATIVE
NRBC BLD AUTO-RTO: 0 %
PH UR STRIP.AUTO: 6 [PH]
PLATELET # BLD AUTO: 379 X10(3)/MCL (ref 130–400)
PMV BLD AUTO: 10.1 FL (ref 7.4–10.4)
POTASSIUM SERPL-SCNC: 4.2 MMOL/L (ref 3.5–5.1)
PROT SERPL-MCNC: 6.8 GM/DL (ref 6.4–8.3)
PROT UR QL STRIP.AUTO: NEGATIVE MG/DL
RBC # BLD AUTO: 4.18 X10(6)/MCL (ref 4.2–5.4)
RBC #/AREA URNS AUTO: ABNORMAL /HPF
RBC UR QL AUTO: ABNORMAL UNIT/L
SODIUM SERPL-SCNC: 139 MMOL/L (ref 136–145)
SP GR UR STRIP.AUTO: 1.02
SQUAMOUS #/AREA URNS LPF: ABNORMAL /HPF
T PALLIDUM AB SER QL: NONREACTIVE
T4 FREE SERPL-MCNC: 0.96 NG/DL (ref 0.7–1.48)
TIBC SERPL-MCNC: 259 UG/DL (ref 70–310)
TIBC SERPL-MCNC: 349 UG/DL (ref 250–450)
TRANSFERRIN SERPL-MCNC: 299 MG/DL (ref 180–382)
TRIGL SERPL-MCNC: 79 MG/DL (ref 37–140)
TSH SERPL-ACNC: 0.4 UIU/ML (ref 0.35–4.94)
UROBILINOGEN UR STRIP-ACNC: NORMAL MG/DL
VLDLC SERPL CALC-MCNC: 16 MG/DL
WBC # SPEC AUTO: 9.6 X10(3)/MCL (ref 4.5–11.5)
WBC #/AREA URNS AUTO: ABNORMAL /HPF

## 2022-10-17 PROCEDURE — 82043 UR ALBUMIN QUANTITATIVE: CPT

## 2022-10-17 PROCEDURE — 83036 HEMOGLOBIN GLYCOSYLATED A1C: CPT

## 2022-10-17 PROCEDURE — 87591 N.GONORRHOEAE DNA AMP PROB: CPT

## 2022-10-17 PROCEDURE — 87491 CHLMYD TRACH DNA AMP PROBE: CPT

## 2022-10-17 PROCEDURE — 87389 HIV-1 AG W/HIV-1&-2 AB AG IA: CPT

## 2022-10-17 PROCEDURE — 80053 COMPREHEN METABOLIC PANEL: CPT

## 2022-10-17 PROCEDURE — 83550 IRON BINDING TEST: CPT

## 2022-10-17 PROCEDURE — 80074 ACUTE HEPATITIS PANEL: CPT

## 2022-10-17 PROCEDURE — 81001 URINALYSIS AUTO W/SCOPE: CPT

## 2022-10-17 PROCEDURE — 86780 TREPONEMA PALLIDUM: CPT

## 2022-10-17 PROCEDURE — 80061 LIPID PANEL: CPT

## 2022-10-17 PROCEDURE — 85025 COMPLETE CBC W/AUTO DIFF WBC: CPT

## 2022-10-17 PROCEDURE — 82728 ASSAY OF FERRITIN: CPT

## 2022-10-17 PROCEDURE — 36415 COLL VENOUS BLD VENIPUNCTURE: CPT

## 2022-10-17 PROCEDURE — 82306 VITAMIN D 25 HYDROXY: CPT

## 2022-10-17 PROCEDURE — 84439 ASSAY OF FREE THYROXINE: CPT

## 2022-10-17 PROCEDURE — 84443 ASSAY THYROID STIM HORMONE: CPT

## 2022-10-18 LAB — PATH REV: NORMAL

## 2022-10-19 ENCOUNTER — OFFICE VISIT (OUTPATIENT)
Dept: INTERNAL MEDICINE | Facility: CLINIC | Age: 32
End: 2022-10-19
Payer: MEDICAID

## 2022-10-19 VITALS
HEART RATE: 77 BPM | TEMPERATURE: 98 F | DIASTOLIC BLOOD PRESSURE: 83 MMHG | SYSTOLIC BLOOD PRESSURE: 124 MMHG | WEIGHT: 199.63 LBS | BODY MASS INDEX: 43.07 KG/M2 | RESPIRATION RATE: 18 BRPM | HEIGHT: 57 IN | OXYGEN SATURATION: 98 %

## 2022-10-19 DIAGNOSIS — E78.2 MIXED HYPERLIPIDEMIA: ICD-10-CM

## 2022-10-19 DIAGNOSIS — I10 PRIMARY HYPERTENSION: Primary | ICD-10-CM

## 2022-10-19 DIAGNOSIS — R73.03 PREDIABETES: ICD-10-CM

## 2022-10-19 DIAGNOSIS — E55.9 VITAMIN D DEFICIENCY: ICD-10-CM

## 2022-10-19 DIAGNOSIS — G43.009 MIGRAINE WITHOUT AURA AND WITHOUT STATUS MIGRAINOSUS, NOT INTRACTABLE: ICD-10-CM

## 2022-10-19 PROBLEM — E66.01 MORBID OBESITY: Status: RESOLVED | Noted: 2022-05-25 | Resolved: 2022-10-19

## 2022-10-19 PROBLEM — Z12.4 CERVICAL CANCER SCREENING: Status: RESOLVED | Noted: 2022-08-22 | Resolved: 2022-10-19

## 2022-10-19 PROBLEM — H60.533: Status: RESOLVED | Noted: 2022-09-21 | Resolved: 2022-10-19

## 2022-10-19 PROBLEM — Z23 IMMUNIZATION DUE: Status: RESOLVED | Noted: 2022-09-21 | Resolved: 2022-10-19

## 2022-10-19 PROBLEM — R42 VERTIGO: Status: RESOLVED | Noted: 2022-09-21 | Resolved: 2022-10-19

## 2022-10-19 PROCEDURE — 3066F NEPHROPATHY DOC TX: CPT | Mod: CPTII,,,

## 2022-10-19 PROCEDURE — 99214 OFFICE O/P EST MOD 30 MIN: CPT | Mod: S$PBB,,,

## 2022-10-19 PROCEDURE — 3074F PR MOST RECENT SYSTOLIC BLOOD PRESSURE < 130 MM HG: ICD-10-PCS | Mod: CPTII,,,

## 2022-10-19 PROCEDURE — 3061F PR NEG MICROALBUMINURIA RESULT DOCUMENTED/REVIEW: ICD-10-PCS | Mod: CPTII,,,

## 2022-10-19 PROCEDURE — 3074F SYST BP LT 130 MM HG: CPT | Mod: CPTII,,,

## 2022-10-19 PROCEDURE — 3079F PR MOST RECENT DIASTOLIC BLOOD PRESSURE 80-89 MM HG: ICD-10-PCS | Mod: CPTII,,,

## 2022-10-19 PROCEDURE — 1160F PR REVIEW ALL MEDS BY PRESCRIBER/CLIN PHARMACIST DOCUMENTED: ICD-10-PCS | Mod: CPTII,,,

## 2022-10-19 PROCEDURE — 1159F PR MEDICATION LIST DOCUMENTED IN MEDICAL RECORD: ICD-10-PCS | Mod: CPTII,,,

## 2022-10-19 PROCEDURE — 99214 PR OFFICE/OUTPT VISIT, EST, LEVL IV, 30-39 MIN: ICD-10-PCS | Mod: S$PBB,,,

## 2022-10-19 PROCEDURE — 1160F RVW MEDS BY RX/DR IN RCRD: CPT | Mod: CPTII,,,

## 2022-10-19 PROCEDURE — 99215 OFFICE O/P EST HI 40 MIN: CPT | Mod: PBBFAC

## 2022-10-19 PROCEDURE — 3066F PR DOCUMENTATION OF TREATMENT FOR NEPHROPATHY: ICD-10-PCS | Mod: CPTII,,,

## 2022-10-19 PROCEDURE — 3061F NEG MICROALBUMINURIA REV: CPT | Mod: CPTII,,,

## 2022-10-19 PROCEDURE — 3079F DIAST BP 80-89 MM HG: CPT | Mod: CPTII,,,

## 2022-10-19 PROCEDURE — 1159F MED LIST DOCD IN RCRD: CPT | Mod: CPTII,,,

## 2022-10-19 RX ORDER — AMLODIPINE BESYLATE 5 MG/1
5 TABLET ORAL DAILY
Qty: 90 TABLET | Refills: 1 | Status: SHIPPED | OUTPATIENT
Start: 2022-10-19 | End: 2023-04-17

## 2022-10-19 RX ORDER — ASPIRIN 325 MG
50000 TABLET, DELAYED RELEASE (ENTERIC COATED) ORAL
Qty: 12 CAPSULE | Refills: 0 | Status: SHIPPED | OUTPATIENT
Start: 2022-10-19 | End: 2023-04-26 | Stop reason: SDUPTHER

## 2022-10-19 RX ORDER — METFORMIN HYDROCHLORIDE 500 MG/1
500 TABLET ORAL
Qty: 90 TABLET | Refills: 1 | Status: SHIPPED | OUTPATIENT
Start: 2022-10-19 | End: 2023-04-26 | Stop reason: SDUPTHER

## 2022-10-19 NOTE — ASSESSMENT & PLAN NOTE
Lab Results   Component Value Date    HGBA1C 5.5 10/17/2022     Continue metformin as prescribed.  Avoid soda, simple sweets, and limit rice/pasta/bread/starches and consume brown options when possible.    Maintain healthy weight with BMI goal <30.    Perform aerobic exercise for 150 minutes per week (or 5 days a week for 30 minutes each day).

## 2022-10-19 NOTE — ASSESSMENT & PLAN NOTE
Lab Results   Component Value Date    [id number] 10.8 (L) 10/17/2022     Educated on increasing foods high in Vitamin D such as fish oil, cod liver oil, salmon, milk fortified with vitamin D.  RX Vitamin D3 10445 IU weekly x 12 weeks.  Complete entire 12 weeks of Vitamin D prescription.  After completion of prescription (12 weeks/3 months), begin taking Vitamin D 2000 I.U. tablets daily (purchase over the counter).  Repeat Vitamin D level as ordered.

## 2022-10-19 NOTE — ASSESSMENT & PLAN NOTE
BP- 124/83 - at goal.  Follow a low sodium (less than 2 grams of sodium per day), DASH diet.   Continue amlodipine as prescribed.  Monitor blood pressure and report any consistent values greater than 140/90 and keep a log.   Maintain healthy weight with a BMI goal of <30.   Aerobic exercise for 150 minutes per week (or 5 days a week for 30 minutes each day).

## 2022-10-19 NOTE — PROGRESS NOTES
PATIENT NAME: Shani Suarez  : 1990  DATE: 10/19/22  MRN: [id number]          Reason for Visit/Chief Complaint   Follow-up and Labs Only       History of Present Illness (HPI)     Shani Suarez is a 32 y.o. female presenting in clinic today Follow-up and Labs Only. PMH: HTN, chronic tinea pedis, chronic left calcaneous pain, and prediabetes. She is currently followed by [facility] wound care. She has a pending referral to Dr. Silveira.      Labs dated 10/17/2022 reviewed and discussed with patient.   Urinalysis 2+ occult blood and RBC-6-10. She is currently on her menstrual cycle.  Vitamin D-10.8. Not currently taking any vitamin D supplements.  All other labs unremarkable.    At last office visit she had c/o migraines. She denies any migraine symptoms today.      BP-124/83 - at goal. She did not present a log. Denies smoking or illicit drug use. She drinks alcohol occasionally. Denies chest pain, shortness of breath, cough, headache, dizziness, weakness, abdominal pain, nausea, vomiting, diarrhea, constipation, dysuria, depression, anxiety, SI, and HI.     Cervical Cancer Screening - Last Pap in 2018. GYN appt: 2023.  Breast Cancer Screening - Deferred due to age. Will initiate testing at age 40.  Colon Cancer Screening - Deferred due to age. Will initiate testing at age 45.  Vaccinations: Flu - 2022 / Tetanus - 3/1/2018 /COVID - 2020, 2021, 10/19/2021    Review of Systems     Review of Systems   Constitutional: Negative.    HENT: Negative.     Eyes: Negative.    Respiratory: Negative.     Cardiovascular: Negative.    Gastrointestinal: Negative.    Endocrine: Negative.    Genitourinary: Negative.    Musculoskeletal: Negative.    Skin: Negative.    Allergic/Immunologic: Negative.    Neurological: Negative.  Negative for headaches.   Hematological: Negative.    Psychiatric/Behavioral: Negative.     All other systems reviewed and are negative.    Medical / Social / Family History      Past Medical History:   Diagnosis Date    AC (acromioclavicular) arthritis     Hyperlipidemia     Hypertension          Past Surgical History:   Procedure Laterality Date     SECTION           Social History  Shani Dempsey  reports that she has never smoked. She has never used smokeless tobacco. She reports current alcohol use. She reports that she does not use drugs.    Family History  Shani Dempsey family history includes Diabetes in her mother; Hypertension in her father.    Medications and Allergies     Medications  Medication List with Changes/Refills   New Medications    CHOLECALCIFEROL, VITAMIN D3, 1,250 MCG (50,000 UNIT) CAPSULE    Take 1 capsule (50,000 Units total) by mouth every 7 days.   Current Medications    ACCU-CHEK GUIDE TEST STRIPS STRP    TEST BLOOD GLUCOSE ONCE DAILY    BLOOD-GLUCOSE METER KIT    To check BG daily, to use with insurance preferred meter    LANCETS (ACCU-CHEK SOFTCLIX LANCETS) MISC    CHECK BLOOD GLUCOSE ONCE DAILY    MECLIZINE (ANTIVERT) 12.5 MG TABLET    Take 1 tablet (12.5 mg total) by mouth 3 (three) times daily as needed for Dizziness.    SUMATRIPTAN (IMITREX) 25 MG TAB    Take 1 tablet (25 mg total) by mouth every 4 to 6 hours as needed (Migraine). Dose may be repeated once after 2 hours of initial administration.   Changed and/or Refilled Medications    Modified Medication Previous Medication    AMLODIPINE (NORVASC) 5 MG TABLET amLODIPine (NORVASC) 5 MG tablet       Take 1 tablet (5 mg total) by mouth once daily.    Take 1 tablet (5 mg total) by mouth once daily.    METFORMIN (GLUCOPHAGE) 500 MG TABLET metFORMIN (GLUCOPHAGE) 500 MG tablet       Take 1 tablet (500 mg total) by mouth daily with breakfast.    Take 1 tablet (500 mg total) by mouth daily with breakfast.   Discontinued Medications    GENTIAN VIOLET 1 % TOPICAL SOLUTION    Apply 1 mL topically daily as needed.    MELOXICAM (MOBIC) 15 MG TABLET    Take 1 tablet (15 mg total) by mouth  once daily. With food and plenty of water (8 ounces)         Allergies  Review of patient's allergies indicates:  No Known Allergies    Physical Examination     Vitals:    10/19/22 0914   BP: 124/83   Pulse: 77   Resp: 18   Temp: 98.2 °F (36.8 °C)     Physical Exam  Vitals reviewed.   Constitutional:       Appearance: Normal appearance. She is normal weight.   HENT:      Head: Normocephalic and atraumatic.      Right Ear: External ear normal.      Left Ear: External ear normal.      Nose: Nose normal.      Mouth/Throat:      Mouth: Mucous membranes are moist.      Pharynx: Oropharynx is clear.   Eyes:      Extraocular Movements: Extraocular movements intact.      Conjunctiva/sclera: Conjunctivae normal.      Pupils: Pupils are equal, round, and reactive to light.   Cardiovascular:      Rate and Rhythm: Normal rate and regular rhythm.      Pulses: Normal pulses.      Heart sounds: Normal heart sounds.   Pulmonary:      Effort: Pulmonary effort is normal.      Breath sounds: Normal breath sounds.   Abdominal:      General: Bowel sounds are normal.      Palpations: Abdomen is soft.   Musculoskeletal:         General: Normal range of motion.      Cervical back: Normal range of motion and neck supple.   Skin:     General: Skin is warm and dry.      Capillary Refill: Capillary refill takes less than 2 seconds.   Neurological:      General: No focal deficit present.      Mental Status: She is alert and oriented to person, place, and time.   Psychiatric:         Mood and Affect: Mood normal.         Behavior: Behavior normal.         Thought Content: Thought content normal.         Judgment: Judgment normal.         Results     Lab Results   Component Value Date    WBC 9.6 10/17/2022    RBC 4.18 (L) 10/17/2022    HGB 12.1 10/17/2022    HCT 35.2 (L) 10/17/2022    MCV 84.2 10/17/2022    MCH 28.9 10/17/2022    MCHC 34.4 10/17/2022    RDW 12.3 10/17/2022     10/17/2022    MPV 10.1 10/17/2022     Lab Results   Component  Value Date     10/17/2022    K 4.2 10/17/2022    CO2 23 10/17/2022    BUN 10.6 10/17/2022    CREATININE 0.63 10/17/2022    CALCIUM 9.2 10/17/2022    ALBUMIN 3.8 10/17/2022    BILITOT 0.4 10/17/2022    ALKPHOS 119 10/17/2022    AST 16 10/17/2022    ALT 12 10/17/2022    EGFRIFAFRICA >60 07/06/2022    EGFRNONAA 105 04/07/2022     Lab Results   Component Value Date    TSH 0.3962 10/17/2022     Lab Results   Component Value Date    CHOL 182 10/17/2022    HDL 40 10/17/2022    .00 10/17/2022    TRIG 79 10/17/2022     Lab Results   Component Value Date    COLORU YELLOW 11/12/2021    APPEARANCEUA Clear 10/17/2022    PHUR 6.0 11/12/2021    GLUCUA Negative 11/12/2021    KETONESU Negative 11/12/2021    OCCULTUA 2+ (A) 11/12/2021    NITRITE Negative 11/12/2021    LEUKOCYTESUR Negative 10/17/2022    RBCUA 6-10 (A) 10/17/2022    WBCUA 0-5 10/17/2022    BACTERIA None Seen 10/17/2022    HYALINECASTS None Seen 10/17/2022     Lab Results   Component Value Date    CREATRANDUR 104.1 10/17/2022     Lab Results   Component Value Date    [id number] 10.8 (L) 10/17/2022     Lab Results   Component Value Date    HIV Nonreactive 10/17/2022    HEPAIGM Nonreactive 10/17/2022    HEPBCOREM Nonreactive 10/17/2022    HEPCAB Nonreactive 10/17/2022     No results found for: FITDIAG, COLOGUARD  No results found for: OCCBLDIA        Assessment and Plan (including Health Maintenance)     Health Maintenance Due   Topic Date Due    Cervical Cancer Screening  Never done    COVID-19 Vaccine (4 - Booster for Pfizer series) 12/14/2021       Problem List Items Addressed This Visit          Neuro    Migraine without aura and without status migrainosus, not intractable    Current Assessment & Plan     Continue sumatriptan as prescribed.  Denies any migraine symptoms today.            Cardiac/Vascular    Hyperlipidemia    Hypertension - Primary    Current Assessment & Plan     BP- 124/83 - at goal.  Follow a low sodium (less than 2 grams of sodium  per day), DASH diet.   Continue amlodipine as prescribed.  Monitor blood pressure and report any consistent values greater than 140/90 and keep a log.   Maintain healthy weight with a BMI goal of <30.   Aerobic exercise for 150 minutes per week (or 5 days a week for 30 minutes each day).          Relevant Medications    amLODIPine (NORVASC) 5 MG tablet    Other Relevant Orders    CBC Auto Differential    Comprehensive Metabolic Panel    Microalbumin/Creatinine Ratio, Urine    Urinalysis, Reflex to Urine Culture Urine, Clean Catch       Endocrine    Prediabetes    Overview     Borderline prediabetes HgA1c 7/6/2022 was 5.7 with prior 11/2021 being 5.5.  Pt's tinea pedis not resolving and suspected that IGT/ borderline prediabetes may be cause of agents not being successful in resolving this issue. Discussed this in detail with the patient and pt agrees to eat ADA eating habit (discussed) and start metformin for 4 -6 months to control CBS.          Current Assessment & Plan     Lab Results   Component Value Date    HGBA1C 5.5 10/17/2022   Continue metformin as prescribed.  Avoid soda, simple sweets, and limit rice/pasta/bread/starches and consume brown options when possible.    Maintain healthy weight with BMI goal <30.    Perform aerobic exercise for 150 minutes per week (or 5 days a week for 30 minutes each day).           Relevant Medications    metFORMIN (GLUCOPHAGE) 500 MG tablet    Other Relevant Orders    Hemoglobin A1C    BMI 40.0-44.9, adult    Current Assessment & Plan     Body mass index is 43.18 kg/m².  Goal BMI <30.  Aerobic exercise 150 minutes per week.  Avoid soda, simple sugars, sweets, excessive rice, pasta, potatoes or bread.   Choose brown options when available and portion control.  Limit fast foods and fried foods.   Choose complex carbs in moderation (ex: green, leafy vegetables, beans, oatmeal).  Eat plenty of fresh fruits and vegetables with lean meats daily.   Consider permanent healthy  lifestyle changes.          Vitamin D deficiency    Current Assessment & Plan     Lab Results   Component Value Date    [id number] 10.8 (L) 10/17/2022   Educated on increasing foods high in Vitamin D such as fish oil, cod liver oil, salmon, milk fortified with vitamin D.  RX Vitamin D3 75501 IU weekly x 12 weeks.  Complete entire 12 weeks of Vitamin D prescription.  After completion of prescription (12 weeks/3 months), begin taking Vitamin D 2000 I.U. tablets daily (purchase over the counter).  Repeat Vitamin D level as ordered.          Relevant Medications    cholecalciferol, vitamin D3, 1,250 mcg (50,000 unit) capsule    Other Relevant Orders    Vitamin D       Health Maintenance Topics with due status: Not Due       Topic Last Completion Date    TETANUS VACCINE 03/01/2018       Future Appointments   Date Time Provider Department Center   [facility]   4/24/2023  8:30 AM JOLENE Millan [facility]        RTC in 6 month(s) and prn.  Labs within a week of visit.      Signature:        JOLENE Millan  [facility]  [address] [address]    Date of encounter: 10/19/22

## 2022-12-29 ENCOUNTER — OFFICE VISIT (OUTPATIENT)
Dept: URGENT CARE | Facility: CLINIC | Age: 32
End: 2022-12-29
Payer: MEDICAID

## 2022-12-29 VITALS
WEIGHT: 200 LBS | OXYGEN SATURATION: 100 % | HEART RATE: 85 BPM | DIASTOLIC BLOOD PRESSURE: 85 MMHG | RESPIRATION RATE: 20 BRPM | SYSTOLIC BLOOD PRESSURE: 134 MMHG | BODY MASS INDEX: 39.27 KG/M2 | TEMPERATURE: 99 F | HEIGHT: 60 IN

## 2022-12-29 DIAGNOSIS — R05.9 COUGH, UNSPECIFIED TYPE: ICD-10-CM

## 2022-12-29 DIAGNOSIS — H66.91 RIGHT OTITIS MEDIA, UNSPECIFIED OTITIS MEDIA TYPE: Primary | ICD-10-CM

## 2022-12-29 PROCEDURE — 99213 OFFICE O/P EST LOW 20 MIN: CPT | Mod: S$PBB,,, | Performed by: NURSE PRACTITIONER

## 2022-12-29 PROCEDURE — 99204 OFFICE O/P NEW MOD 45 MIN: CPT | Mod: PBBFAC | Performed by: NURSE PRACTITIONER

## 2022-12-29 PROCEDURE — 99213 PR OFFICE/OUTPT VISIT, EST, LEVL III, 20-29 MIN: ICD-10-PCS | Mod: S$PBB,,, | Performed by: NURSE PRACTITIONER

## 2022-12-29 RX ORDER — AMOXICILLIN 875 MG/1
875 TABLET, FILM COATED ORAL 2 TIMES DAILY
Qty: 20 TABLET | Refills: 0 | Status: SHIPPED | OUTPATIENT
Start: 2022-12-29 | End: 2023-01-08

## 2022-12-29 RX ORDER — AMLODIPINE BESYLATE 5 MG/1
5 TABLET ORAL
COMMUNITY
Start: 2022-12-04 | End: 2023-04-26 | Stop reason: SDUPTHER

## 2022-12-29 RX ORDER — BLOOD-GLUCOSE METER
EACH MISCELLANEOUS
COMMUNITY
Start: 2022-07-06

## 2022-12-29 RX ORDER — METFORMIN HYDROCHLORIDE 500 MG/1
500 TABLET ORAL EVERY MORNING
COMMUNITY
Start: 2022-12-04 | End: 2023-04-26 | Stop reason: SDUPTHER

## 2022-12-29 RX ORDER — PROMETHAZINE HYDROCHLORIDE AND DEXTROMETHORPHAN HYDROBROMIDE 6.25; 15 MG/5ML; MG/5ML
5 SYRUP ORAL EVERY 6 HOURS PRN
Qty: 100 ML | Refills: 0 | Status: SHIPPED | OUTPATIENT
Start: 2022-12-29 | End: 2023-04-26 | Stop reason: SDUPTHER

## 2022-12-29 RX ORDER — BLOOD SUGAR DIAGNOSTIC
STRIP MISCELLANEOUS
COMMUNITY
Start: 2022-07-10 | End: 2023-04-26 | Stop reason: SDUPTHER

## 2022-12-29 RX ORDER — LANCETS
EACH MISCELLANEOUS
COMMUNITY
Start: 2022-07-10

## 2022-12-29 NOTE — LETTER
December 29, 2022      [facility]  [address] [address]  Phone: [phone]       Patient: Shani Suarez   YOB: 1990  Date of Visit: 12/29/2022    To Whom It May Concern:    Elizabeth Suarez  was at [facility] on 12/29/2022. The patient may return to work/school on 12/30/2022 with no restrictions. If you have any questions or concerns, or if I can be of further assistance, please do not hesitate to contact me.    Sincerely,      Janes Gaspar, NP

## 2022-12-29 NOTE — PROGRESS NOTES
Subjective:       Patient ID: Shani Suarez is a 32 y.o. female.    Vitals:  height is 5' (1.524 m) and weight is 90.7 kg (200 lb). Her oral temperature is 98.7 °F (37.1 °C). Her blood pressure is 134/85 and her pulse is 85. Her respiration is 20 and oxygen saturation is 100%.     Chief Complaint: Otalgia (X 3 day, yesterday she noticed a bump inside the ear that is painful./Also had a cold, took at home Covid test and it was negative.)    CC as above.  Congestion, cough started a week ago.  Taking over-the-counter medications.  Home COVID test was negative.      Constitution: Negative.   HENT:  Positive for ear pain and congestion.    Neck: neck negative.   Cardiovascular: Negative.    Respiratory:  Positive for cough.      Objective:      Physical Exam   Constitutional: She is oriented to person, place, and time. She appears well-developed.   HENT:   Head: Normocephalic.   Ears:   Right Ear: Tympanic membrane is erythematous.   Left Ear: Tympanic membrane normal.   Nose: Congestion present.   Mouth/Throat: Oropharynx is clear.   Eyes: Conjunctivae and EOM are normal. Pupils are equal, round, and reactive to light.   Neck: Neck supple.   Cardiovascular: Normal rate, regular rhythm and normal heart sounds.   Pulmonary/Chest: Effort normal and breath sounds normal.   Musculoskeletal: Normal range of motion.         General: Normal range of motion.   Neurological: She is alert and oriented to person, place, and time.   Skin: Skin is warm and dry.   Psychiatric: Her behavior is normal.   Vitals reviewed.      Assessment:       1. Right otitis media, unspecified otitis media type    2. Cough, unspecified type            No visits with results within 1 Day(s) from this visit.   Latest known visit with results is:   No results found for any previous visit.        No results found.   Plan:         Medication as ordered. May use humidifier.  If any shortness of breath, wheezing, continued fevers or any new symptoms then  immediately go to ER.     Right otitis media, unspecified otitis media type  -     amoxicillin (AMOXIL) 875 MG tablet; Take 1 tablet (875 mg total) by mouth 2 (two) times daily. for 10 days  Dispense: 20 tablet; Refill: 0    Cough, unspecified type  -     promethazine-dextromethorphan (PROMETHAZINE-DM) 6.25-15 mg/5 mL Syrp; Take 5 mLs by mouth every 6 (six) hours as needed (cough).  Dispense: 100 mL; Refill: 0

## 2023-02-01 ENCOUNTER — OFFICE VISIT (OUTPATIENT)
Dept: GYNECOLOGY | Facility: CLINIC | Age: 33
End: 2023-02-01
Payer: MEDICAID

## 2023-02-01 VITALS
HEIGHT: 55 IN | BODY MASS INDEX: 47.4 KG/M2 | SYSTOLIC BLOOD PRESSURE: 129 MMHG | TEMPERATURE: 99 F | WEIGHT: 204.81 LBS | RESPIRATION RATE: 20 BRPM | DIASTOLIC BLOOD PRESSURE: 86 MMHG | HEART RATE: 83 BPM | OXYGEN SATURATION: 100 %

## 2023-02-01 DIAGNOSIS — Z01.419 WELL WOMAN EXAM: ICD-10-CM

## 2023-02-01 LAB
B-HCG UR QL: NEGATIVE
CLUE CELLS VAG QL WET PREP: ABNORMAL
CTP QC/QA: YES
T VAGINALIS VAG QL WET PREP: ABNORMAL
WBC #/AREA VAG WET PREP: ABNORMAL
YEAST SPEC QL WET PREP: ABNORMAL

## 2023-02-01 PROCEDURE — 1159F MED LIST DOCD IN RCRD: CPT | Mod: CPTII,,, | Performed by: NURSE PRACTITIONER

## 2023-02-01 PROCEDURE — 87491 CHLMYD TRACH DNA AMP PROBE: CPT

## 2023-02-01 PROCEDURE — 99215 OFFICE O/P EST HI 40 MIN: CPT | Mod: PBBFAC | Performed by: NURSE PRACTITIONER

## 2023-02-01 PROCEDURE — 87624 HPV HI-RISK TYP POOLED RSLT: CPT

## 2023-02-01 PROCEDURE — 1160F PR REVIEW ALL MEDS BY PRESCRIBER/CLIN PHARMACIST DOCUMENTED: ICD-10-PCS | Mod: CPTII,,, | Performed by: NURSE PRACTITIONER

## 2023-02-01 PROCEDURE — 99395 PREV VISIT EST AGE 18-39: CPT | Mod: S$PBB,,, | Performed by: NURSE PRACTITIONER

## 2023-02-01 PROCEDURE — 3008F BODY MASS INDEX DOCD: CPT | Mod: CPTII,,, | Performed by: NURSE PRACTITIONER

## 2023-02-01 PROCEDURE — 3074F PR MOST RECENT SYSTOLIC BLOOD PRESSURE < 130 MM HG: ICD-10-PCS | Mod: CPTII,,, | Performed by: NURSE PRACTITIONER

## 2023-02-01 PROCEDURE — 3079F PR MOST RECENT DIASTOLIC BLOOD PRESSURE 80-89 MM HG: ICD-10-PCS | Mod: CPTII,,, | Performed by: NURSE PRACTITIONER

## 2023-02-01 PROCEDURE — 3074F SYST BP LT 130 MM HG: CPT | Mod: CPTII,,, | Performed by: NURSE PRACTITIONER

## 2023-02-01 PROCEDURE — 3008F PR BODY MASS INDEX (BMI) DOCUMENTED: ICD-10-PCS | Mod: CPTII,,, | Performed by: NURSE PRACTITIONER

## 2023-02-01 PROCEDURE — 87210 SMEAR WET MOUNT SALINE/INK: CPT | Performed by: NURSE PRACTITIONER

## 2023-02-01 PROCEDURE — 88174 CYTOPATH C/V AUTO IN FLUID: CPT

## 2023-02-01 PROCEDURE — 1159F PR MEDICATION LIST DOCUMENTED IN MEDICAL RECORD: ICD-10-PCS | Mod: CPTII,,, | Performed by: NURSE PRACTITIONER

## 2023-02-01 PROCEDURE — 87591 N.GONORRHOEAE DNA AMP PROB: CPT

## 2023-02-01 PROCEDURE — 99395 PR PREVENTIVE VISIT,EST,18-39: ICD-10-PCS | Mod: S$PBB,,, | Performed by: NURSE PRACTITIONER

## 2023-02-01 PROCEDURE — 3079F DIAST BP 80-89 MM HG: CPT | Mod: CPTII,,, | Performed by: NURSE PRACTITIONER

## 2023-02-01 PROCEDURE — 1160F RVW MEDS BY RX/DR IN RCRD: CPT | Mod: CPTII,,, | Performed by: NURSE PRACTITIONER

## 2023-02-01 PROCEDURE — 81025 URINE PREGNANCY TEST: CPT | Mod: PBBFAC | Performed by: NURSE PRACTITIONER

## 2023-02-01 NOTE — PROGRESS NOTES
"Patient ID: Shani Suarez is a 32 y.o. female.    Chief Complaint: cervial cancer screening       Review of patient's allergies indicates:  No Known Allergies        HPI:  The patient is  here for annual gyn exam. Denies hx of abnormal pap smear. Last pap . Pt has nexplanon implant placed in 2018. LMP 2022. States has period every 2 months. Bleeding pattern ranges with the implant .States will occassionally spot lightly for up to 3 weeks.Denies pelvic pain. Denies urinary or breast complaints.  Contraceptive hx-depo provera, nexplanon. STI hx-chlamydia. Pt had negative HIV, Hep, Syphilis ab testing 10/2022. Denies fly hx of breast ,ovarian, uterine, or colon cancer. Pt is nonsmoker. She is engaged and getting  in May. PMHx-HTN/DM.    Review of Systems:   Negative except for findings in HPI     Objective:   /86 (BP Location: Left arm, Patient Position: Sitting, BP Method: Medium (Automatic))   Pulse 83   Temp 98.9 °F (37.2 °C) (Oral)   Resp 20   Ht 4' 6" (1.372 m)   Wt 92.9 kg (204 lb 12.8 oz)   SpO2 100%   BMI 49.38 kg/m²    Physical Exam:  GENERAL: Pt is aware and alert and  in no acute distress.  BREASTS: Bilateral-No masses, nipple discharge, skin changes, or tenderness.  ABDOMEN: Soft, non tender.  VULVA:  No lesions or skin changes.  URETHRA: No lesions  BLADDER: No tenderness.  VAGINA: Mucosa normal,scant amount of white discharge; no lesions.  CERVIX:  no CMT, NO discharge; NO lesions  BIMANUAL EXAM: reveals an 8 week-sized uterus. The uterus is mobile, nontender, no palpable masses. Biju adnexa reveal no evidence of masses; no fullness   SKIN: Warm and Dry  PSYCHIATRIC: Patient is awake and alert. Mood and affect are normal.    Assessment:   Well woman exam  -     Ambulatory referral/consult to Gynecology  -     Wet Prep, Genital  -     POCT urine pregnancy  -     Liquid-Based Pap Smear, Screening Screening            1. Well woman exam               -pap/hpv/gc/wet " prep  -upt negative  -informed pt that her neplanon is  and she needs to use back up contraception. She is interested in another. Front office staff to schedule next available removal and replacement of implant  -Encouraged healthy diet and exercise. Avoid soda and sugary drinks and consider diet low in carbohydrates (rice/pasta/chips/bread/crackers).    Plan:       Follow up in about 4 weeks (around 3/1/2023) for next available procedure spot for removal/replacement of nexplanon.

## 2023-02-06 LAB — PSYCHE PATHOLOGY RESULT: NORMAL

## 2023-04-05 ENCOUNTER — OFFICE VISIT (OUTPATIENT)
Dept: GYNECOLOGY | Facility: CLINIC | Age: 33
End: 2023-04-05

## 2023-04-05 VITALS
WEIGHT: 202 LBS | OXYGEN SATURATION: 100 % | BODY MASS INDEX: 43.58 KG/M2 | TEMPERATURE: 97 F | RESPIRATION RATE: 20 BRPM | HEIGHT: 57 IN | DIASTOLIC BLOOD PRESSURE: 84 MMHG | SYSTOLIC BLOOD PRESSURE: 146 MMHG | HEART RATE: 78 BPM

## 2023-04-05 DIAGNOSIS — Z30.9 ENCOUNTER FOR CONTRACEPTIVE MANAGEMENT, UNSPECIFIED TYPE: Primary | ICD-10-CM

## 2023-04-05 DIAGNOSIS — Z30.46 ENCOUNTER FOR NEXPLANON REMOVAL: ICD-10-CM

## 2023-04-05 PROCEDURE — 11982 REMOVE DRUG IMPLANT DEVICE: CPT | Mod: PBBFAC | Performed by: NURSE PRACTITIONER

## 2023-04-05 PROCEDURE — 11982 REMOVAL OF NEXPLANON DEVICE: ICD-10-PCS | Mod: S$PBB,,, | Performed by: NURSE PRACTITIONER

## 2023-04-05 PROCEDURE — 99214 OFFICE O/P EST MOD 30 MIN: CPT | Mod: PBBFAC | Performed by: NURSE PRACTITIONER

## 2023-04-05 RX ORDER — LIDOCAINE HYDROCHLORIDE 10 MG/ML
1 INJECTION INFILTRATION; PERINEURAL
Status: ACTIVE | OUTPATIENT
Start: 2023-04-05

## 2023-04-05 RX ORDER — ACETAMINOPHEN AND CODEINE PHOSPHATE 120; 12 MG/5ML; MG/5ML
1 SOLUTION ORAL DAILY
Qty: 30 TABLET | Refills: 11 | Status: SHIPPED | OUTPATIENT
Start: 2023-04-05 | End: 2024-03-13

## 2023-04-05 NOTE — PROCEDURES
Removal of Nexplanon Device    Date/Time: 4/5/2023 10:10 AM  Performed by: OJLENE Ramires  Authorized by: JOLENE Ramires     Consent obtained:  Verbal and written  Consent given by:  Patient  Procedure risks and benefits discussed: yes    Patient questions answered: yes    Patient agrees, verbalizes understanding, and wants to proceed: yes    Educational handouts given: yes    Instructions and paperwork completed: yes    Implant grasped by: hemostat  Removed with no complications: yes     Notify clinic with any fever or redness/drainage from the site  Removal due to expiration: yes    Arm: left  Palpation confirms location: yes  Small stab incision was made in arm: yes  Upon removal device was intact: yes  Site was close with steri-strips and pressure bandage applied: yes  Pre-procedure timeout performed: yes  Local anesthetic:  Lidocaine 1%   The site was cleaned  and prepped in a sterile fashion: yes

## 2023-04-05 NOTE — PROGRESS NOTES
"Patient ID: Shani Suarez is a 32 y.o. female.    Chief Complaint: Nexplanon Removal/Insertion      Review of patient's allergies indicates:  No Known Allergies           HPI:  The patient is  initially scheduled today for removal and replacement of  nexplanon however pt is self pay. She is interested in learning about alternative contraceptive options until she can get coverage. Pt currently on menses. UPT today negative.   Contraceptive hx-depo provera, nexplanon. Pt is nonsmoker. She is engaged and getting  in May. PMHx-HTN/DM.      Review of Systems:   Negative except for findings in HPI     Objective:   BP (!) 146/84 (BP Location: Right arm, Patient Position: Sitting, BP Method: Large (Automatic))   Pulse 78   Temp 97.4 °F (36.3 °C) (Oral)   Resp 20   Ht 4' 9" (1.448 m)   Wt 91.6 kg (202 lb)   SpO2 100%   BMI 43.71 kg/m²    Physical Exam:  GENERAL: Pt is aware and alert and  in no acute distress.   SKIN: Warm and Dry  PSYCHIATRIC: Patient is awake and alert. Mood and affect are normal.    Assessment:   Encounter for contraceptive management, unspecified type    Encounter for Nexplanon removal  -     POCT urine pregnancy  -     LIDOcaine HCL 10 mg/ml (1%) injection 1 mL  -     Removal of Nexplanon Device    Other orders  -     norethindrone (ORTHO MICRONOR) 0.35 mg tablet; Take 1 tablet (0.35 mg total) by mouth once daily.  Dispense: 30 tablet; Refill: 11            1. Encounter for contraceptive management, unspecified type    2. Encounter for Nexplanon removal             -nexplanon removed today; see procedure note  -pt with no insurance coverage. Not good candidate for COCs d/t comorbidities. Discussed progesterone only pill. Informed pt that she must take at the same time daily and if greater than 3 hours last could lose effectiveness and lead to pregnancy. Verbalized understanding and accepts. Encouraged pt to go to [place] today to see if she may qualify for " medicaid/free care  Plan:       Follow up in about 1 year (around 4/5/2024).

## 2023-04-26 ENCOUNTER — OFFICE VISIT (OUTPATIENT)
Dept: INTERNAL MEDICINE | Facility: CLINIC | Age: 33
End: 2023-04-26

## 2023-04-26 ENCOUNTER — PATIENT MESSAGE (OUTPATIENT)
Dept: INTERNAL MEDICINE | Facility: CLINIC | Age: 33
End: 2023-04-26

## 2023-04-26 ENCOUNTER — HOSPITAL ENCOUNTER (OUTPATIENT)
Dept: RADIOLOGY | Facility: HOSPITAL | Age: 33
Discharge: HOME | End: 2023-04-26

## 2023-04-26 VITALS
SYSTOLIC BLOOD PRESSURE: 134 MMHG | WEIGHT: 199.5 LBS | HEART RATE: 80 BPM | BODY MASS INDEX: 43.04 KG/M2 | HEIGHT: 57 IN | OXYGEN SATURATION: 97 % | DIASTOLIC BLOOD PRESSURE: 82 MMHG | TEMPERATURE: 98 F | RESPIRATION RATE: 18 BRPM

## 2023-04-26 DIAGNOSIS — B35.3 TINEA PEDIS OF BOTH FEET: ICD-10-CM

## 2023-04-26 DIAGNOSIS — Z13.29 SCREENING FOR THYROID DISORDER: ICD-10-CM

## 2023-04-26 DIAGNOSIS — B35.3 TINEA PEDIS OF BOTH FEET: Primary | ICD-10-CM

## 2023-04-26 DIAGNOSIS — Z13.0 SCREENING FOR IRON DEFICIENCY ANEMIA: ICD-10-CM

## 2023-04-26 DIAGNOSIS — I10 PRIMARY HYPERTENSION: ICD-10-CM

## 2023-04-26 DIAGNOSIS — E55.9 VITAMIN D DEFICIENCY: ICD-10-CM

## 2023-04-26 DIAGNOSIS — R73.03 PREDIABETES: ICD-10-CM

## 2023-04-26 PROCEDURE — 73630 X-RAY EXAM OF FOOT: CPT | Mod: TC,50

## 2023-04-26 PROCEDURE — 99214 PR OFFICE/OUTPT VISIT, EST, LEVL IV, 30-39 MIN: ICD-10-PCS | Mod: S$PBB,,,

## 2023-04-26 PROCEDURE — 99215 OFFICE O/P EST HI 40 MIN: CPT | Mod: PBBFAC

## 2023-04-26 PROCEDURE — 99214 OFFICE O/P EST MOD 30 MIN: CPT | Mod: S$PBB,,,

## 2023-04-26 RX ORDER — NYSTATIN 100000 [USP'U]/G
POWDER TOPICAL 2 TIMES DAILY
Qty: 60 G | Refills: 1 | Status: SHIPPED | OUTPATIENT
Start: 2023-04-26 | End: 2023-06-19 | Stop reason: SDUPTHER

## 2023-04-26 RX ORDER — FLUCONAZOLE 200 MG/1
200 TABLET ORAL WEEKLY
Qty: 6 TABLET | Refills: 0 | Status: SHIPPED | OUTPATIENT
Start: 2023-04-26 | End: 2023-06-07 | Stop reason: ALTCHOICE

## 2023-04-26 RX ORDER — ASPIRIN 325 MG
50000 TABLET, DELAYED RELEASE (ENTERIC COATED) ORAL
Qty: 12 CAPSULE | Refills: 0 | Status: SHIPPED | OUTPATIENT
Start: 2023-04-26 | End: 2023-10-11 | Stop reason: ALTCHOICE

## 2023-04-26 RX ORDER — METFORMIN HYDROCHLORIDE 500 MG/1
500 TABLET ORAL
Qty: 90 TABLET | Refills: 1 | Status: SHIPPED | OUTPATIENT
Start: 2023-04-26 | End: 2023-10-11 | Stop reason: SDUPTHER

## 2023-04-26 RX ORDER — AMLODIPINE BESYLATE 5 MG/1
5 TABLET ORAL DAILY
Qty: 90 TABLET | Refills: 1 | Status: SHIPPED | OUTPATIENT
Start: 2023-04-26 | End: 2023-10-11 | Stop reason: SDUPTHER

## 2023-04-26 NOTE — ASSESSMENT & PLAN NOTE
BP Readings from Last 3 Encounters:   04/26/23 134/82   04/05/23 (!) 146/84   02/01/23 129/86      At goal.  Follow a low sodium (less than 2 grams of sodium per day), DASH diet.   Continue amlodipine as prescribed - refilled today.  Monitor blood pressure and report any consistent values greater than 140/90 and keep a log.  Maintain healthy weight with a BMI goal of <30.   Aerobic exercise for 150 minutes per week (or 5 days a week for 30 minutes each day).

## 2023-04-26 NOTE — ASSESSMENT & PLAN NOTE
Rx diflucan 200 mg weekly x6 weeks.  Rx nystatin powder BID.  Referral to Dr. Nic Welch.  RTC in 6 weeks for evaluation.  Xray of feet (wounds in between 3rd and 4th and 4th and 5th toes with odor).  Dry feet, in between toes and toenails thoroughly after bathing.   Dry feet and change socks frequently with excessive sweating.   Use/purchase tinactin powder spray; spray to affected area BID and keep toes  until area is completely dried then apply sheep skin to area.

## 2023-04-26 NOTE — ASSESSMENT & PLAN NOTE
Body mass index is 43.17 kg/m².  Goal BMI <30.  Aerobic exercise 150 minutes per week.  Avoid soda, simple sugars, sweets, excessive rice, pasta, potatoes or bread.   Choose brown options when available and portion control.  Limit fast foods and fried foods.   Choose complex carbs in moderation (ex: green, leafy vegetables, beans, oatmeal).  Eat plenty of fresh fruits and vegetables with lean meats daily.   Consider permanent healthy lifestyle changes.

## 2023-04-26 NOTE — ASSESSMENT & PLAN NOTE
Lab Results   Component Value Date    HGBA1C 5.6 04/26/2023     Continue metformin as prescribed - refilled today.  Avoid soda, simple sweets, and limit rice/pasta/bread/starches and consume brown options when possible.    Maintain healthy weight with BMI goal <30.    Perform aerobic exercise for 150 minutes per week (or 5 days a week for 30 minutes each day).

## 2023-04-26 NOTE — PATIENT INSTRUCTIONS
Nic Welch MD  [facility] [place] [id number] [address] [address]  [phone]    REMINDER: Please complete labs within 1 week of appointment.   Please complete satisfaction survey when received. Thank you.

## 2023-04-26 NOTE — PROGRESS NOTES
PATIENT NAME: Shani Suarez  : 1990  DATE: 23  MRN: [id number]          Reason for Visit/Chief Complaint   Follow-up, Labs Only, Referral, and Tinea Pedis       History of Present Illness (HPI)     Shani Suarez is a 32 y.o. Black female presenting in clinic today for Follow-up, Labs Only, Referral, and Tinea Pedis. PMH: HTN, chronic tinea pedis, chronic left calcaneous pain, and prediabetes. She was previously followed by [facility] wound care (tinea pedis).     All pertinent labs dated 2023 and diagnotic tests reviewed and discussed with patient. Did not complete urine studies - unable to void.    BP-134/82 - at goal. She did not present a log. Denies CP, SOB, HA, dizziness, or vision changes. Does not follow a formal exercise plan.    H/o chronic tinea pedis that has been ongoing for over a year. She was previously referred to Dr. Silveira, but never received an appt. Today she presents in clinic with irritation and pain in feet. She reports an odor in toes. She states using a blow dryer on feet after taking a bath. She states she has used tinactin spray, anti-fungal powder, terbinafine 250 mg daily x 14 days in 2021, genitian stacey, CMPD (compound)  of clindamycin, mupirocin, itraconazole, and streptomycin mixed in a diluent and sprayed to the toes BID by [facility].    Denies smoking or illicit drug use. She drinks alcohol occasionally. Denies chest pain, shortness of breath, cough, headache, dizziness, weakness, abdominal pain, nausea, vomiting, diarrhea, constipation, dysuria, depression, anxiety, SI, and HI.     Cervical Cancer Screening - PAP: 2023.  Breast Cancer Screening - Deferred due to age. Will initiate testing at age 40.  Colon Cancer Screening - Deferred due to age. Will initiate testing at age 45.  Vaccinations: Flu - 2022 / Tetanus - 3/1/2018 /COVID - 2020, 2021, 10/19/2021       Review of Systems     Review of Systems   Constitutional:  Negative.    HENT: Negative.     Eyes: Negative.    Respiratory: Negative.     Cardiovascular: Negative.    Gastrointestinal: Negative.    Endocrine: Negative.    Genitourinary: Negative.    Musculoskeletal: Negative.    Skin:  Positive for wound.   Allergic/Immunologic: Negative.    Neurological: Negative.    Hematological: Negative.    Psychiatric/Behavioral: Negative.     All other systems reviewed and are negative.    Medical / Social / Family History     Past Medical History:   Diagnosis Date    AC (acromioclavicular) arthritis     Diabetes mellitus, type 2     Hyperlipidemia     Hypertension          Past Surgical History:   Procedure Laterality Date     SECTION           Social History  Shani Dempsey  reports that she has never smoked. She has never used smokeless tobacco. She reports that she does not currently use alcohol. She reports that she does not use drugs.    Family History  Shani Dempsey family history includes Diabetes in her mother; Hypertension in her father.    Medications and Allergies     Medications  Current Outpatient Medications   Medication Instructions    ACCU-CHEK GUIDE GLUCOSE METER Misc CHECK BLOOD GLUCOSE ONCE DAILY    ACCU-CHEK GUIDE TEST STRIPS Strp TEST BLOOD GLUCOSE ONCE DAILY    ACCU-CHEK SOFTCLIX LANCETS Misc Topical (Top)    amLODIPine (NORVASC) 5 mg, Oral, Daily    cholecalciferol (vitamin D3) 50,000 Units, Oral, Every 7 days    fluconazole (DIFLUCAN) 200 mg, Oral, Weekly    metFORMIN (GLUCOPHAGE) 500 mg, Oral, With breakfast    norethindrone (ORTHO MICRONOR) 0.35 mg, Oral, Daily    nystatin (MYCOSTATIN) powder Topical (Top), 2 times daily    sumatriptan (IMITREX) 25 mg, Oral, Every 4-6 hours PRN, Dose may be repeated once after 2 hours of initial administration.       Allergies  Review of patient's allergies indicates:  No Known Allergies    Physical Examination   Visit Vitals  /82 (BP Location: Left arm, Patient Position: Sitting, BP Method:  "Large (Automatic))   Pulse 80   Temp 98 °F (36.7 °C) (Oral)   Resp 18   Ht 4' 9" (1.448 m)   Wt 90.5 kg (199 lb 8.3 oz)   SpO2 97%   BMI 43.17 kg/m²     Physical Exam  Vitals reviewed.   Constitutional:       Appearance: Normal appearance. She is normal weight.   HENT:      Head: Normocephalic and atraumatic.      Right Ear: External ear normal.      Left Ear: External ear normal.      Nose: Nose normal.      Mouth/Throat:      Mouth: Mucous membranes are moist.      Pharynx: Oropharynx is clear.   Eyes:      Extraocular Movements: Extraocular movements intact.      Conjunctiva/sclera: Conjunctivae normal.      Pupils: Pupils are equal, round, and reactive to light.   Cardiovascular:      Rate and Rhythm: Normal rate and regular rhythm.      Pulses: Normal pulses.      Heart sounds: Normal heart sounds.   Pulmonary:      Effort: Pulmonary effort is normal.      Breath sounds: Normal breath sounds.   Abdominal:      General: Bowel sounds are normal.      Palpations: Abdomen is soft.   Musculoskeletal:         General: Normal range of motion.      Cervical back: Normal range of motion and neck supple.   Feet:      Right foot:      Skin integrity: Skin breakdown and fissure present.      Left foot:      Skin integrity: Skin breakdown and fissure present.      Comments: Foul odor with peeling skin, maceration in between 3rd and 4th and 4th and 5th toes.   Skin:     General: Skin is warm and dry.      Capillary Refill: Capillary refill takes less than 2 seconds.   Neurological:      General: No focal deficit present.      Mental Status: She is alert and oriented to person, place, and time.   Psychiatric:         Mood and Affect: Mood normal.         Behavior: Behavior normal.         Thought Content: Thought content normal.         Judgment: Judgment normal.         Results     Lab Results   Component Value Date    WBC 12.0 (H) 04/26/2023    RBC 4.31 04/26/2023    HGB 12.1 04/26/2023    HCT 36.0 (L) 04/26/2023    MCV " 83.5 04/26/2023    MCH 28.1 04/26/2023    MCHC 33.6 04/26/2023    RDW 12.8 04/26/2023     04/26/2023    MPV 10.1 04/26/2023      Lab Results   Component Value Date     04/26/2023    K 3.7 04/26/2023    CHLORIDE 105 04/26/2023    CO2 24 04/26/2023    GLUCOSE 107 (H) 04/26/2023    BUN 9.2 04/26/2023    CREATININE 0.73 04/26/2023    LABPROT 7.2 04/26/2023    ALBUMIN 3.8 04/26/2023    BILITOT 0.4 04/26/2023    ALKPHOS 120 04/26/2023    AST 16 04/26/2023    ALT 13 04/26/2023    EGFRNORACEVR >60 04/26/2023     Lab Results   Component Value Date    TSH 0.3962 10/17/2022     Lab Results   Component Value Date    CHOL 182 10/17/2022    HDL 40 10/17/2022    .00 10/17/2022    TRIG 79 10/17/2022     Lab Results   Component Value Date    COLORUA Light-Yellow 10/17/2022    SGUA 1.020 10/17/2022    PROTEINUA Negative 10/17/2022    GLUCOSEUA Normal 10/17/2022    BILIRUBINUA Negative 10/17/2022    BLOODUA 2+ (A) 10/17/2022    WBCUA 0-5 10/17/2022    RBCUA 6-10 (A) 10/17/2022    BACTERIA None Seen 10/17/2022    NITRITESUA Negative 10/17/2022    LEUKOCYTESUR Negative 10/17/2022    UROBILINOGEN Normal 10/17/2022     Lab Results   Component Value Date    CREATRANDUR 104.1 10/17/2022    MICALBCREAT 11.1 10/17/2022     Lab Results   Component Value Date    FBVYAMDN33AG 13.5 (L) 04/26/2023     Lab Results   Component Value Date    HIV Nonreactive 10/17/2022    HEPAIGM Nonreactive 10/17/2022    HEPBCOREM Nonreactive 10/17/2022    HEPCAB Nonreactive 10/17/2022       Assessment and Plan (including Health Maintenance)     Problem List Items Addressed This Visit          Derm    Tinea pedis - Primary    Overview       Today, pt states tinea pedis has improved somewhat but still consistently between 3rd, 4th, and 5th toes. Pt has not been contacted by [facility] Dr. Welch (he is no longer working at that site) and wound care clinic [facility].  Pt will be referred to another podiatrist and referred again to wound care clinic  for this issue.     5/25/22 visit  Tinea pedis started prior to 6/2021; treated with terbinafine 250 mg daily x 14 days in 6/2021 by PCP. Pt referred to [facility] because of worsening infection with macerated skin between toes with whitish fungal element.  She was tarted on gentian violet 1% soln daily prn on 4/19/2021. Pt had little to no resolution. Pt was placed on 4/20/2022 started on CMPD (compound)  of clindamycin, mupirocin, itraconazole, and streptomycin mixed in a diluent and sprayed to the toes BID by [facility].  Duration: almost a year. Previous podiatry referral has been  delayed due to extensive wait. Pt is needing a podiatry referral urgently to aid in resolution of this  nearly year long issue. Pt complains of symptoms of intense itching from infection.            Current Assessment & Plan     Rx diflucan 200 mg weekly x6 weeks.  Rx nystatin powder BID.  Referral to Dr. Nic Welch.  RTC in 6 weeks for evaluation.  Xray of feet (wounds in between 3rd and 4th and 4th and 5th toes with odor).  Dry feet, in between toes and toenails thoroughly after bathing.   Dry feet and change socks frequently with excessive sweating.   Use/purchase tinactin powder spray; spray to affected area BID and keep toes  until area is completely dried then apply sheep skin to area.           Relevant Medications    fluconazole (DIFLUCAN) 200 MG Tab    nystatin (MYCOSTATIN) powder    Other Relevant Orders    Ambulatory referral/consult to Podiatry    X-Ray Foot Complete Bilateral       Cardiac/Vascular    Hypertension    Current Assessment & Plan     BP Readings from Last 3 Encounters:   04/26/23 134/82   04/05/23 (!) 146/84   02/01/23 129/86      At goal.  Follow a low sodium (less than 2 grams of sodium per day), DASH diet.   Continue amlodipine as prescribed - refilled today.  Monitor blood pressure and report any consistent values greater than 140/90 and keep a log.  Maintain healthy weight with a  BMI goal of <30.   Aerobic exercise for 150 minutes per week (or 5 days a week for 30 minutes each day).          Relevant Medications    amLODIPine (NORVASC) 5 MG tablet    Other Relevant Orders    Urinalysis, Reflex to Urine Culture    Microalbumin/Creatinine Ratio, Urine    Lipid Panel    Comprehensive Metabolic Panel    CBC Auto Differential       Endocrine    Prediabetes    Current Assessment & Plan     Lab Results   Component Value Date    HGBA1C 5.6 04/26/2023   Continue metformin as prescribed - refilled today.  Avoid soda, simple sweets, and limit rice/pasta/bread/starches and consume brown options when possible.    Maintain healthy weight with BMI goal <30.    Perform aerobic exercise for 150 minutes per week (or 5 days a week for 30 minutes each day).           Relevant Medications    metFORMIN (GLUCOPHAGE) 500 MG tablet    Other Relevant Orders    Hemoglobin A1C    BMI 40.0-44.9, adult    Current Assessment & Plan     Body mass index is 43.17 kg/m².  Goal BMI <30.  Aerobic exercise 150 minutes per week.  Avoid soda, simple sugars, sweets, excessive rice, pasta, potatoes or bread.   Choose brown options when available and portion control.  Limit fast foods and fried foods.   Choose complex carbs in moderation (ex: green, leafy vegetables, beans, oatmeal).  Eat plenty of fresh fruits and vegetables with lean meats daily.   Consider permanent healthy lifestyle changes.          Vitamin D deficiency    Current Assessment & Plan     Lab Results   Component Value Date    [id number] 13.5 (L) 04/26/2023   Educated on increasing foods high in Vitamin D such as fish oil, cod liver oil, salmon, milk fortified with vitamin D.  RX Vitamin D3 52789 IU weekly x 12 weeks.  Complete entire 12 weeks of Vitamin D prescription.  After completion of prescription (12 weeks/3 months), begin taking Vitamin D 2000 I.U. tablets daily (purchase over the counter).  Repeat Vitamin D level as ordered.          Relevant Medications     cholecalciferol, vitamin D3, 1,250 mcg (50,000 unit) capsule    Other Relevant Orders    Vitamin D     Other Visit Diagnoses       Screening for thyroid disorder        Relevant Orders    TSH    T4, Free    Screening for iron deficiency anemia        Relevant Orders    Iron and TIBC    Ferritin               There are no preventive care reminders to display for this patient.  All of your core healthy metrics are met.      Health Maintenance Topics with due status: Not Due       Topic Last Completion Date    TETANUS VACCINE 03/01/2018    Cervical Cancer Screening 02/01/2023    Hemoglobin A1c (Prediabetes) 04/26/2023       Future Appointments   Date Time Provider Department Center   6/7/2023  7:30 AM JOLENE Mill[facility]   2/6/2024 10:30 AM JOLENE Ramires [facility]        Follow up in about 6 weeks (around 6/7/2023) for F2F, Follow up, tinea pedis.  RTC PRN.  Labs within a week of visit.        Signature:        JOLENE Millan  [facility]  [address] [id number]    Date of encounter: 4/26/23

## 2023-04-26 NOTE — ASSESSMENT & PLAN NOTE
Lab Results   Component Value Date    [id number] 13.5 (L) 04/26/2023     Educated on increasing foods high in Vitamin D such as fish oil, cod liver oil, salmon, milk fortified with vitamin D.  RX Vitamin D3 96808 IU weekly x 12 weeks.  Complete entire 12 weeks of Vitamin D prescription.  After completion of prescription (12 weeks/3 months), begin taking Vitamin D 2000 I.U. tablets daily (purchase over the counter).  Repeat Vitamin D level as ordered.

## 2023-05-14 ENCOUNTER — OFFICE VISIT (OUTPATIENT)
Dept: URGENT CARE | Facility: CLINIC | Age: 33
End: 2023-05-14

## 2023-05-14 VITALS
RESPIRATION RATE: 20 BRPM | BODY MASS INDEX: 41.42 KG/M2 | TEMPERATURE: 99 F | WEIGHT: 192 LBS | DIASTOLIC BLOOD PRESSURE: 87 MMHG | OXYGEN SATURATION: 100 % | HEART RATE: 87 BPM | HEIGHT: 57 IN | SYSTOLIC BLOOD PRESSURE: 138 MMHG

## 2023-05-14 DIAGNOSIS — R11.0 NAUSEA WITHOUT VOMITING: Primary | ICD-10-CM

## 2023-05-14 DIAGNOSIS — N39.0 URINARY TRACT INFECTION WITH HEMATURIA, SITE UNSPECIFIED: ICD-10-CM

## 2023-05-14 DIAGNOSIS — R31.9 URINARY TRACT INFECTION WITH HEMATURIA, SITE UNSPECIFIED: ICD-10-CM

## 2023-05-14 LAB
B-HCG UR QL: NEGATIVE
BILIRUB UR QL STRIP: NEGATIVE
CTP QC/QA: YES
GLUCOSE UR QL STRIP: NEGATIVE
KETONES UR QL STRIP: POSITIVE
LEUKOCYTE ESTERASE UR QL STRIP: POSITIVE
PH, POC UA: 6
POC BLOOD, URINE: POSITIVE
POC NITRATES, URINE: NEGATIVE
PROT UR QL STRIP: POSITIVE
SP GR UR STRIP: 1.02 (ref 1–1.03)
UROBILINOGEN UR STRIP-ACNC: 1 (ref 0.1–1.1)

## 2023-05-14 PROCEDURE — 99203 PR OFFICE/OUTPT VISIT, NEW, LEVL III, 30-44 MIN: ICD-10-PCS | Mod: S$PBB,,, | Performed by: NURSE PRACTITIONER

## 2023-05-14 PROCEDURE — 99215 OFFICE O/P EST HI 40 MIN: CPT | Mod: PBBFAC | Performed by: NURSE PRACTITIONER

## 2023-05-14 PROCEDURE — 81025 URINE PREGNANCY TEST: CPT | Mod: PBBFAC | Performed by: NURSE PRACTITIONER

## 2023-05-14 PROCEDURE — 25000003 PHARM REV CODE 250: Performed by: NURSE PRACTITIONER

## 2023-05-14 PROCEDURE — 81003 URINALYSIS AUTO W/O SCOPE: CPT | Mod: PBBFAC | Performed by: NURSE PRACTITIONER

## 2023-05-14 PROCEDURE — 99203 OFFICE O/P NEW LOW 30 MIN: CPT | Mod: S$PBB,,, | Performed by: NURSE PRACTITIONER

## 2023-05-14 RX ORDER — ONDANSETRON 4 MG/1
4 TABLET, ORALLY DISINTEGRATING ORAL ONCE
Status: COMPLETED | OUTPATIENT
Start: 2023-05-14 | End: 2023-05-14

## 2023-05-14 RX ORDER — NITROFURANTOIN 25; 75 MG/1; MG/1
100 CAPSULE ORAL 2 TIMES DAILY
Qty: 14 CAPSULE | Refills: 0 | Status: SHIPPED | OUTPATIENT
Start: 2023-05-14 | End: 2023-06-07 | Stop reason: ALTCHOICE

## 2023-05-14 RX ORDER — ONDANSETRON 4 MG/1
4 TABLET, ORALLY DISINTEGRATING ORAL EVERY 6 HOURS PRN
Qty: 16 TABLET | Refills: 0 | Status: SHIPPED | OUTPATIENT
Start: 2023-05-14 | End: 2023-06-07 | Stop reason: ALTCHOICE

## 2023-05-14 RX ADMIN — ONDANSETRON 4 MG: 4 TABLET, ORALLY DISINTEGRATING ORAL at 06:05

## 2023-05-14 NOTE — PROGRESS NOTES
"Subjective:      Patient ID: Shani Suarez is a 32 y.o. female.    Vitals:  height is 4' 9" (1.448 m) and weight is 87.1 kg (192 lb). Her oral temperature is 99 °F (37.2 °C). Her blood pressure is 138/87 and her pulse is 87. Her respiration is 20 and oxygen saturation is 100%.     Chief Complaint: Nausea (Nausea and upper abdomen pain (tightness) for 2 days)    Nausea  Associated symptoms include nausea.  presents with nausea and abdominal tightness for 2 days. Pt reports history of constipation. Last bowel movement yesterday normal.     Gastrointestinal:  Positive for nausea.    Objective:     Physical Exam   Constitutional: She is oriented to person, place, and time. She does not appear ill.   HENT:   Mouth/Throat: Mucous membranes are moist.   Eyes: Conjunctivae are normal.   Cardiovascular: Normal rate.   Pulmonary/Chest: Effort normal and breath sounds normal.   Abdominal: Normal appearance and bowel sounds are normal. She exhibits no distension. Soft. flat abdomen There is no abdominal tenderness. There is no rebound, no tenderness at McBurney's point, negative Huber's sign, no left CVA tenderness, negative Rovsing's sign, negative psoas sign and no right CVA tenderness.   Neurological: She is alert and oriented to person, place, and time.   Skin: Skin is warm and dry.   Psychiatric: Her behavior is normal. Mood, judgment and thought content normal.   Nursing note and vitals reviewed.    Assessment:     1. Nausea without vomiting    2. Urinary tract infection with hematuria, site unspecified      Results for orders placed or performed in visit on 05/14/23   POCT Urinalysis, Dipstick, Automated, W/O Scope   Result Value Ref Range    POC Blood, Urine Positive (A) Negative    POC Bilirubin, Urine Negative Negative    POC Urobilinogen, Urine 1.0 0.1 - 1.1    POC Ketones, Urine Positive (A) Negative    POC Protein, Urine Positive (A) Negative    POC Nitrates, Urine Negative Negative    POC Glucose, Urine " Negative Negative    pH, UA 6.0     POC Specific Gravity, Urine 1.025 1.003 - 1.029    POC Leukocytes, Urine Positive (A) Negative   POCT urine pregnancy   Result Value Ref Range    POC Preg Test, Ur Negative Negative     Acceptable Yes        Plan:   Please follow instructions on patient education material.  Return to urgent care in 2 to 3 days if symptoms are not improving, immediately if you develop any new or worsening symptoms.   As discussed take your Metformin and eat a snake tonight. Go to the ER if your symptoms do not subside or if they worsen.     Nausea without vomiting  -     POCT Urinalysis, Dipstick, Automated, W/O Scope  -     POCT urine pregnancy  -     ondansetron disintegrating tablet 4 mg    Urinary tract infection with hematuria, site unspecified    Other orders  -     nitrofurantoin, macrocrystal-monohydrate, (MACROBID) 100 MG capsule; Take 1 capsule (100 mg total) by mouth 2 (two) times daily. for 7 days  Dispense: 14 capsule; Refill: 0  -     ondansetron (ZOFRAN-ODT) 4 MG TbDL; Take 1 tablet (4 mg total) by mouth every 6 (six) hours as needed (nausea).  Dispense: 16 tablet; Refill: 0

## 2023-06-07 ENCOUNTER — OFFICE VISIT (OUTPATIENT)
Dept: INTERNAL MEDICINE | Facility: CLINIC | Age: 33
End: 2023-06-07

## 2023-06-07 VITALS
DIASTOLIC BLOOD PRESSURE: 84 MMHG | HEART RATE: 79 BPM | OXYGEN SATURATION: 99 % | TEMPERATURE: 98 F | RESPIRATION RATE: 18 BRPM | HEIGHT: 57 IN | BODY MASS INDEX: 42.94 KG/M2 | SYSTOLIC BLOOD PRESSURE: 124 MMHG | WEIGHT: 199.06 LBS

## 2023-06-07 DIAGNOSIS — B35.3 TINEA PEDIS OF BOTH FEET: Primary | ICD-10-CM

## 2023-06-07 PROCEDURE — 99215 OFFICE O/P EST HI 40 MIN: CPT | Mod: PBBFAC

## 2023-06-07 PROCEDURE — 99213 PR OFFICE/OUTPT VISIT, EST, LEVL III, 20-29 MIN: ICD-10-PCS | Mod: S$PBB,,,

## 2023-06-07 PROCEDURE — 99213 OFFICE O/P EST LOW 20 MIN: CPT | Mod: S$PBB,,,

## 2023-06-07 NOTE — ASSESSMENT & PLAN NOTE
Body mass index is 43.08 kg/m².  Goal BMI <30.  Aerobic exercise 150 minutes per week.  Avoid soda, simple sugars, sweets, excessive rice, pasta, potatoes or bread.   Choose brown options when available and portion control.  Limit fast foods and fried foods.   Choose complex carbs in moderation (ex: green, leafy vegetables, beans, oatmeal).  Eat plenty of fresh fruits and vegetables with lean meats daily.   Consider permanent healthy lifestyle changes.

## 2023-06-07 NOTE — PROGRESS NOTES
PATIENT NAME: Shani Suarez  : 1990  DATE: 23  MRN: [id number]          Reason for Visit/Chief Complaint   Follow-up and Foot Pain       History of Present Illness (HPI)     Shani Suarez is a 33 y.o. Black female presenting in clinic today for Follow-up and Foot Pain. PMH: HTN, chronic tinea pedis, chronic left calcaneous pain, and prediabetes. She was previously followed by [facility] wound care (tinea pedis).  BP-134/82 - at goal. She did not present a log. Denies CP, SOB, HA, dizziness, or vision changes. Does not follow a formal exercise plan.    H/o chronic tinea pedis that has been ongoing for over a year. She was previously referred to Dr. Silveira, but never received an appt. Today she presents in clinic with irritation and pain in feet. She reports an odor in toes. She states using a blow dryer on feet after taking a bath. She states she has used tinactin spray, anti-fungal powder, terbinafine 250 mg daily x 14 days in 2021, genitian stacey, CMPD (compound)  of clindamycin, mupirocin, itraconazole, and streptomycin mixed in a diluent and sprayed to the toes BID by [facility]. At last office visit, she was referred to Dr. Nic Welch, given prescription for diflucan weekly x6 weeks, and nystatin powder. Xray of bilat feet were normal. Today, she voices compliance with nystatin powder and completed diflucan regimen.     Denies smoking or illicit drug use. She drinks alcohol occasionally. Denies chest pain, shortness of breath, cough, headache, dizziness, weakness, abdominal pain, nausea, vomiting, diarrhea, constipation, dysuria, depression, anxiety, SI, and HI.     Review of Systems     Review of Systems   Constitutional: Negative.    HENT: Negative.     Eyes: Negative.    Respiratory: Negative.     Cardiovascular: Negative.    Gastrointestinal: Negative.    Endocrine: Negative.    Genitourinary: Negative.    Musculoskeletal: Negative.    Skin:  Positive for wound.  "  Allergic/Immunologic: Negative.    Neurological: Negative.    Hematological: Negative.    Psychiatric/Behavioral: Negative.     All other systems reviewed and are negative.    Medical / Social / Family History     Past Medical History:   Diagnosis Date    AC (acromioclavicular) arthritis     Diabetes mellitus, type 2     Hyperlipidemia     Hypertension          Past Surgical History:   Procedure Laterality Date     SECTION           Social History  Shani Dempsey  reports that she has never smoked. She has never used smokeless tobacco. She reports that she does not currently use alcohol. She reports that she does not use drugs.    Family History  Shani Dempsey family history includes Diabetes in her mother; Hypertension in her father.    Medications and Allergies     Medications  Current Outpatient Medications   Medication Instructions    ACCU-CHEK GUIDE GLUCOSE METER Misc CHECK BLOOD GLUCOSE ONCE DAILY    ACCU-CHEK GUIDE TEST STRIPS Strp TEST BLOOD GLUCOSE ONCE DAILY    ACCU-CHEK SOFTCLIX LANCETS Misc Topical (Top)    amLODIPine (NORVASC) 5 mg, Oral, Daily    cholecalciferol (vitamin D3) 50,000 Units, Oral, Every 7 days    metFORMIN (GLUCOPHAGE) 500 mg, Oral, With breakfast    norethindrone (ORTHO MICRONOR) 0.35 mg, Oral, Daily    nystatin (MYCOSTATIN) powder Topical (Top), 2 times daily    sumatriptan (IMITREX) 25 mg, Oral, Every 4-6 hours PRN, Dose may be repeated once after 2 hours of initial administration.       Allergies  Review of patient's allergies indicates:  No Known Allergies    Physical Examination   Visit Vitals  /84 (BP Location: Right arm, Patient Position: Sitting, BP Method: Large (Automatic))   Pulse 79   Temp 97.9 °F (36.6 °C) (Oral)   Resp 18   Ht 4' 9" (1.448 m)   Wt 90.3 kg (199 lb 1.2 oz)   LMP 04/10/2023 (Approximate)   SpO2 99%   BMI 43.08 kg/m²     Physical Exam  Vitals reviewed.   Constitutional:       Appearance: Normal appearance. She is normal " weight.   HENT:      Head: Normocephalic and atraumatic.      Right Ear: External ear normal.      Left Ear: External ear normal.      Nose: Nose normal.      Mouth/Throat:      Mouth: Mucous membranes are moist.      Pharynx: Oropharynx is clear.   Eyes:      Extraocular Movements: Extraocular movements intact.      Conjunctiva/sclera: Conjunctivae normal.      Pupils: Pupils are equal, round, and reactive to light.   Cardiovascular:      Rate and Rhythm: Normal rate and regular rhythm.      Pulses: Normal pulses.      Heart sounds: Normal heart sounds.   Pulmonary:      Effort: Pulmonary effort is normal.      Breath sounds: Normal breath sounds.   Abdominal:      General: Bowel sounds are normal.      Palpations: Abdomen is soft.   Musculoskeletal:         General: Normal range of motion.      Cervical back: Normal range of motion and neck supple.   Feet:      Right foot:      Skin integrity: Skin breakdown and fissure present.      Left foot:      Skin integrity: Skin breakdown and fissure present.      Comments: Maceration in between 3rd and 4th and 4th and 5th toes. No foul odor.  Skin:     General: Skin is warm and dry.      Capillary Refill: Capillary refill takes less than 2 seconds.   Neurological:      General: No focal deficit present.      Mental Status: She is alert and oriented to person, place, and time.   Psychiatric:         Mood and Affect: Mood normal.         Behavior: Behavior normal.         Thought Content: Thought content normal.         Judgment: Judgment normal.         Results     Lab Results   Component Value Date    WBC 12.0 (H) 04/26/2023    RBC 4.31 04/26/2023    HGB 12.1 04/26/2023    HCT 36.0 (L) 04/26/2023    MCV 83.5 04/26/2023    MCH 28.1 04/26/2023    MCHC 33.6 04/26/2023    RDW 12.8 04/26/2023     04/26/2023    MPV 10.1 04/26/2023      Lab Results   Component Value Date     04/26/2023    K 3.7 04/26/2023    CHLORIDE 105 04/26/2023    CO2 24 04/26/2023    GLUCOSE  107 (H) 04/26/2023    BUN 9.2 04/26/2023    CREATININE 0.73 04/26/2023    LABPROT 7.2 04/26/2023    ALBUMIN 3.8 04/26/2023    BILITOT 0.4 04/26/2023    ALKPHOS 120 04/26/2023    AST 16 04/26/2023    ALT 13 04/26/2023    EGFRNORACEVR >60 04/26/2023     Lab Results   Component Value Date    TSH 0.3962 10/17/2022     Lab Results   Component Value Date    CHOL 182 10/17/2022    HDL 40 10/17/2022    .00 10/17/2022    TRIG 79 10/17/2022     Lab Results   Component Value Date    COLORUA Light-Yellow 10/17/2022    SGUA 1.020 10/17/2022    PROTEINUA Negative 10/17/2022    GLUCOSEUA Normal 10/17/2022    BILIRUBINUA Negative 10/17/2022    BLOODUA 2+ (A) 10/17/2022    WBCUA 0-5 10/17/2022    RBCUA 6-10 (A) 10/17/2022    BACTERIA None Seen 10/17/2022    NITRITESUA Negative 10/17/2022    LEUKOCYTESUR Negative 10/17/2022    UROBILINOGEN Normal 10/17/2022     Lab Results   Component Value Date    CREATRANDUR 104.1 10/17/2022    MICALBCREAT 11.1 10/17/2022     Lab Results   Component Value Date    AJQPZHFF01SK 13.5 (L) 04/26/2023     Lab Results   Component Value Date    HIV Nonreactive 10/17/2022    HEPAIGM Nonreactive 10/17/2022    HEPBCOREM Nonreactive 10/17/2022    HEPCAB Nonreactive 10/17/2022       Assessment and Plan (including Health Maintenance)     Problem List Items Addressed This Visit          Derm    Tinea pedis - Primary    Current Assessment & Plan     Continue nystatin powder  Calcium alginate: apply and change once daily.  Follow up with referral to Dr. Nic Welch (Podiatry).  Dry feet, in between toes and toenails thoroughly after bathing.   Dry feet and change socks frequently with excessive sweating.   Use/purchase tinactin powder spray; spray to affected area BID and keep toes  until area is completely dried then apply sheep skin to area.              Endocrine    BMI 40.0-44.9, adult    Current Assessment & Plan     Body mass index is 43.08 kg/m².  Goal BMI <30.  Aerobic exercise 150  minutes per week.  Avoid soda, simple sugars, sweets, excessive rice, pasta, potatoes or bread.   Choose brown options when available and portion control.  Limit fast foods and fried foods.   Choose complex carbs in moderation (ex: green, leafy vegetables, beans, oatmeal).  Eat plenty of fresh fruits and vegetables with lean meats daily.   Consider permanent healthy lifestyle changes.                  Health Maintenance Due   Topic Date Due    COVID-19 Vaccine (6 - Pfizer series) 12/14/2021     All of your core healthy metrics are met.      Health Maintenance Topics with due status: Not Due       Topic Last Completion Date    TETANUS VACCINE 03/01/2018    Cervical Cancer Screening 02/01/2023    Hemoglobin A1c (Prediabetes) 04/26/2023       Future Appointments   Date Time Provider Department Center   9/7/2023  7:45 AM [facility]   2/6/2024 10:30 AM JOLENE Ramires [facility]        Follow up in about 3 months (around 9/7/2023) for F2F, Follow up, Med check, Lab review, RTC PRN.  Labs within a week of visit.        Signature:        JOLENE Millan  [facility]  [address] [facility] [id number]    Date of encounter: 6/7/23

## 2023-06-07 NOTE — ASSESSMENT & PLAN NOTE
Continue nystatin powder  Calcium alginate: apply and change once daily.  Follow up with referral to Dr. Nic Welch (Podiatry).  Dry feet, in between toes and toenails thoroughly after bathing.   Moisture in between toes has improved since last office visit with new granulating tissue in some areas that were previously macerated.  Dry feet and change socks frequently with excessive sweating.   Use/purchase tinactin powder spray; spray to affected area BID and keep toes  until area is completely dried then apply sheep skin to area.

## 2023-06-16 ENCOUNTER — PATIENT MESSAGE (OUTPATIENT)
Dept: INTERNAL MEDICINE | Facility: CLINIC | Age: 33
End: 2023-06-16

## 2023-06-19 ENCOUNTER — OFFICE VISIT (OUTPATIENT)
Dept: URGENT CARE | Facility: CLINIC | Age: 33
End: 2023-06-19

## 2023-06-19 VITALS
RESPIRATION RATE: 18 BRPM | WEIGHT: 198 LBS | HEIGHT: 58 IN | BODY MASS INDEX: 41.56 KG/M2 | TEMPERATURE: 98 F | SYSTOLIC BLOOD PRESSURE: 151 MMHG | DIASTOLIC BLOOD PRESSURE: 96 MMHG | HEART RATE: 87 BPM | OXYGEN SATURATION: 100 %

## 2023-06-19 DIAGNOSIS — B35.3 TINEA PEDIS OF BOTH FEET: Primary | ICD-10-CM

## 2023-06-19 DIAGNOSIS — I10 ELEVATED SYSTOLIC BLOOD PRESSURE READING WITH DIAGNOSIS OF HYPERTENSION: ICD-10-CM

## 2023-06-19 PROCEDURE — 99213 OFFICE O/P EST LOW 20 MIN: CPT | Mod: S$PBB,,, | Performed by: NURSE PRACTITIONER

## 2023-06-19 PROCEDURE — 99213 PR OFFICE/OUTPT VISIT, EST, LEVL III, 20-29 MIN: ICD-10-PCS | Mod: S$PBB,,, | Performed by: NURSE PRACTITIONER

## 2023-06-19 PROCEDURE — 99214 OFFICE O/P EST MOD 30 MIN: CPT | Mod: PBBFAC | Performed by: NURSE PRACTITIONER

## 2023-06-19 RX ORDER — FLUCONAZOLE 150 MG/1
TABLET ORAL
Qty: 4 TABLET | Refills: 0 | Status: SHIPPED | OUTPATIENT
Start: 2023-06-19 | End: 2023-06-23

## 2023-06-19 RX ORDER — CLOTRIMAZOLE 1 %
CREAM (GRAM) TOPICAL 2 TIMES DAILY
Qty: 12 G | Refills: 0 | Status: SHIPPED | OUTPATIENT
Start: 2023-06-19 | End: 2023-10-11 | Stop reason: ALTCHOICE

## 2023-06-19 RX ORDER — NYSTATIN 100000 [USP'U]/G
POWDER TOPICAL 2 TIMES DAILY
Qty: 60 G | Refills: 1 | Status: SHIPPED | OUTPATIENT
Start: 2023-06-19 | End: 2024-03-13 | Stop reason: SDUPTHER

## 2023-06-19 NOTE — PATIENT INSTRUCTIONS
- Bring a pair of socks to school, change them in the middle of the day, every day, for the next 2 weeks  - At home, walk in slides, do not walk with bare feet on floor inside or ground outside.  - Do not wear socks to bed or at home.  - Place a fan in front of the foot 1-2x/day for the next 2 days to keep it dry  - Place foot in the sun for 10-20 minutes daily  - Avoid sweating    Use  Nystatin Power only when feet are open to air/while wearing open toed shoes as discussed.     Drink plenty of water, take breaks at work to elevate your feet, wear compression socks to your work shifts    Take your blood pressure about 4-5 times over the next 1-2 weeks  If readings remain >160/>100 please return to this clinic.. Please bring in your blood pressure and heart rate log sheet if you have to come back, or to your next appointment with PCP to evaluate medication for blood pressure.    If you have change in vision, confusion, difficulty speaking or understanding, weakness of one side of your face, weakness or heaviness in 1 arm or leg, or numbness/tingling in one side - please go immediately to the ER.

## 2023-06-19 NOTE — PROGRESS NOTES
"Subjective:      Patient ID: Shani Suarez is a 33 y.o. female.    Vitals:  height is 4' 10" (1.473 m) and weight is 89.8 kg (198 lb). Her oral temperature is 98.2 °F (36.8 °C). Her blood pressure is 151/96 (abnormal) and her pulse is 87. Her respiration is 18 and oxygen saturation is 100%.     Chief Complaint: Other Misc (Toe pain - Entered by patient) and Foot Pain (R foot  4th and 5th digit pain, skin cracking)    HPI As stated in CC. Pt states that infection had cleared and returned x 1 week ago. Pt reports wearing same work shoes daily.     Cardiovascular:  Negative for chest pain, palpitations and sob on exertion.        Pt reports she took Blood pressure medication 45 min PTA. Denies symtpoms headache, dizziness, shortness of breath or chest pain   Respiratory:  Negative for chest tightness and shortness of breath.    Skin:  Positive for skin thickening/induration and erythema. Negative for color change.        Both feet x 1 week    Objective:     Physical Exam   Constitutional: She does not appear ill.   HENT:   Head: Normocephalic.   Nose: Nose normal.   Mouth/Throat: Oropharynx is clear.   Eyes: Conjunctivae are normal.   Cardiovascular: Normal rate.   Pulmonary/Chest: Effort normal and breath sounds normal.   Abdominal: Normal appearance.   Neurological: She is alert.   Skin: Skin is warm, not diaphoretic and rash. erythema        Nursing note and vitals reviewed.    Assessment:     1. Tinea pedis of both feet    2. Elevated systolic blood pressure reading with diagnosis of hypertension      Pt appears well, noted stable vitals with no need for fluid resuscitation at the time of exam.   Plan:   - Bring a pair of socks to school, change them in the middle of the day, every day, for the next 2 weeks  - At home, walk in slides, do not walk with bare feet on floor inside or ground outside.  - Do not wear socks to bed or at home.  - Place a fan in front of the foot 1-2x/day for the next 2 days to keep " it dry  - Place foot in the sun for 10-20 minutes daily  - Avoid sweating    Use Nystatin Power only when feet are open to air/while wearing open toed shoes as discussed.     drink plenty of water, take breaks at work to elevate your feet, wear compression socks to your work shifts    Take your blood pressure about 4-5 times over the next 1-2 weeks  If readings remain >160/>100 please return to this clinic.. Please bring in your blood pressure and heart rate log sheet if you have to come back, or to your next appointment with PCP to evaluate medication for blood pressure.    If you have change in vision, confusion, difficulty speaking or understanding, weakness of one side of your face, weakness or heaviness in 1 arm or leg, or numbness/tingling in one side - please go immediately to the ER.  Tinea pedis of both feet  -     nystatin (MYCOSTATIN) powder; Apply topically 2 (two) times daily.  Dispense: 60 g; Refill: 1    Elevated systolic blood pressure reading with diagnosis of hypertension    Other orders  -     fluconazole (DIFLUCAN) 150 MG Tab; Take 1 tablet 150 mg weekly x 4 weeks.  Dispense: 4 tablet; Refill: 0  -     clotrimazole (LOTRIMIN) 1 % cream; Apply topically 2 (two) times daily.  Dispense: 12 g; Refill: 0

## 2023-06-20 ENCOUNTER — OFFICE VISIT (OUTPATIENT)
Dept: INTERNAL MEDICINE | Facility: CLINIC | Age: 33
End: 2023-06-20

## 2023-06-20 VITALS
BODY MASS INDEX: 41.7 KG/M2 | HEIGHT: 58 IN | OXYGEN SATURATION: 100 % | RESPIRATION RATE: 18 BRPM | HEART RATE: 84 BPM | WEIGHT: 198.63 LBS | DIASTOLIC BLOOD PRESSURE: 81 MMHG | TEMPERATURE: 98 F | SYSTOLIC BLOOD PRESSURE: 123 MMHG

## 2023-06-20 DIAGNOSIS — B35.3 TINEA PEDIS OF BOTH FEET: Primary | ICD-10-CM

## 2023-06-20 DIAGNOSIS — I10 PRIMARY HYPERTENSION: ICD-10-CM

## 2023-06-20 PROCEDURE — 87070 CULTURE OTHR SPECIMN AEROBIC: CPT

## 2023-06-20 PROCEDURE — 99215 OFFICE O/P EST HI 40 MIN: CPT | Mod: PBBFAC

## 2023-06-20 PROCEDURE — 99214 OFFICE O/P EST MOD 30 MIN: CPT | Mod: S$PBB,,,

## 2023-06-20 PROCEDURE — 99214 PR OFFICE/OUTPT VISIT, EST, LEVL IV, 30-39 MIN: ICD-10-PCS | Mod: S$PBB,,,

## 2023-06-20 NOTE — PROGRESS NOTES
PATIENT NAME: Shani Suarez  : 1990  DATE: 23  MRN: [id number]          Reason for Visit/Chief Complaint   Tinea Pedis       History of Present Illness (HPI)     Shani Suarez is a 33 y.o. Black female presenting in clinic today Tinea Pedis. PMH: HTN, chronic tinea pedis, chronic left calcaneous pain, and prediabetes. She was previously followed by [facility] wound care. She states medicaid insurance is inactive. She will go to [place] [id number] to reapply.    H/o chronic tinea pedis that has been ongoing for over a year. She was previously referred to Dr. Silveira, but never received an appt. On 2023, she presented in clinic with irritation and pain in feet. She reported an odor in toes. She stated using a blow dryer on feet after taking a bath. She reported using tinactin spray, anti-fungal powder, terbinafine 250 mg daily x 14 days in 2021, genitian stacey, CMPD (compound) of clindamycin, mupirocin, itraconazole, and streptomycin mixed in a diluent and sprayed to the toes BID by [facility]. She was referred to Dr. Nic Welch, given prescription for diflucan weekly x6 weeks, and nystatin powder. Xray of bilat feet were normal. On 2023, she presented for follow up and voiced compliance with nystatin powder and completed diflucan regimen. On 2023, patient presented to [facility] with c/o toe pain, skin cracking. She was also noted to be hypertensive during that visit - 151/96. She was prescribed fluconazole 150 mg weekly x4 weeks and clotrimazole cream. Today she reports after having fluconazole regimen after 2023 visit that she as much better, she states the drainage has increased with an odor.    Denies smoking or illicit drug use. She drinks alcohol occasionally. Denies chest pain, shortness of breath, cough, headache, dizziness, weakness, abdominal pain, nausea, vomiting, diarrhea, constipation, dysuria, depression, anxiety, SI, and HI.    Review of Systems     Review  of Systems   Constitutional: Negative.    HENT: Negative.     Eyes: Negative.    Respiratory: Negative.     Cardiovascular: Negative.    Gastrointestinal: Negative.    Endocrine: Negative.    Genitourinary: Negative.    Musculoskeletal: Negative.    Skin:  Positive for wound.   Allergic/Immunologic: Negative.    Neurological: Negative.    Hematological: Negative.    Psychiatric/Behavioral: Negative.     All other systems reviewed and are negative.    Medical / Social / Family History     Past Medical History:   Diagnosis Date    AC (acromioclavicular) arthritis     Diabetes mellitus, type 2     Hyperlipidemia     Hypertension          Past Surgical History:   Procedure Laterality Date     SECTION           Social History  Shani Dempsey  reports that she has never smoked. She has never used smokeless tobacco. She reports that she does not currently use alcohol. She reports that she does not use drugs.    Family History  Shani Suarez's family history includes Diabetes in her mother; Hypertension in her father.    Medications and Allergies     Medications  Current Outpatient Medications   Medication Instructions    ACCU-CHEK GUIDE GLUCOSE METER Misc CHECK BLOOD GLUCOSE ONCE DAILY    ACCU-CHEK GUIDE TEST STRIPS Strp TEST BLOOD GLUCOSE ONCE DAILY    ACCU-CHEK SOFTCLIX LANCETS Misc Topical (Top)    amLODIPine (NORVASC) 5 mg, Oral, Daily    cholecalciferol (vitamin D3) 50,000 Units, Oral, Every 7 days    clotrimazole (LOTRIMIN) 1 % cream Topical (Top), 2 times daily    fluconazole (DIFLUCAN) 150 MG Tab Take 1 tablet 150 mg weekly x 4 weeks.    metFORMIN (GLUCOPHAGE) 500 mg, Oral, With breakfast    norethindrone (ORTHO MICRONOR) 0.35 mg, Oral, Daily    nystatin (MYCOSTATIN) powder Topical (Top), 2 times daily    sumatriptan (IMITREX) 25 mg, Oral, Every 4-6 hours PRN, Dose may be repeated once after 2 hours of initial administration.       Allergies  Review of patient's allergies indicates:  No  "Known Allergies    Physical Examination   Visit Vitals  /81 (BP Location: Right arm, Patient Position: Sitting, BP Method: Large (Automatic))   Pulse 84   Temp 98 °F (36.7 °C) (Oral)   Resp 18   Ht 4' 10" (1.473 m)   Wt 90.1 kg (198 lb 10.2 oz)   SpO2 100%   BMI 41.51 kg/m²     Physical Exam  Vitals reviewed.   Constitutional:       Appearance: Normal appearance. She is normal weight.   HENT:      Head: Normocephalic and atraumatic.      Right Ear: External ear normal.      Left Ear: External ear normal.      Nose: Nose normal.      Mouth/Throat:      Mouth: Mucous membranes are moist.      Pharynx: Oropharynx is clear.   Eyes:      Extraocular Movements: Extraocular movements intact.      Conjunctiva/sclera: Conjunctivae normal.      Pupils: Pupils are equal, round, and reactive to light.   Cardiovascular:      Rate and Rhythm: Normal rate and regular rhythm.      Pulses: Normal pulses.      Heart sounds: Normal heart sounds.   Pulmonary:      Effort: Pulmonary effort is normal.      Breath sounds: Normal breath sounds.   Abdominal:      General: Bowel sounds are normal.      Palpations: Abdomen is soft.   Musculoskeletal:         General: Normal range of motion.      Cervical back: Normal range of motion and neck supple.   Feet:      Right foot:      Skin integrity: Skin breakdown and fissure present.      Left foot:      Skin integrity: Skin breakdown and fissure present.      Comments: Maceration in between 3rd and 4th and 4th and 5th toes. No foul odor.  Skin:     General: Skin is warm and dry.      Capillary Refill: Capillary refill takes less than 2 seconds.   Neurological:      General: No focal deficit present.      Mental Status: She is alert and oriented to person, place, and time.   Psychiatric:         Mood and Affect: Mood normal.         Behavior: Behavior normal.         Thought Content: Thought content normal.         Judgment: Judgment normal.         Results     Lab Results   Component Value " Date    WBC 12.0 (H) 04/26/2023    RBC 4.31 04/26/2023    HGB 12.1 04/26/2023    HCT 36.0 (L) 04/26/2023    MCV 83.5 04/26/2023    MCH 28.1 04/26/2023    MCHC 33.6 04/26/2023    RDW 12.8 04/26/2023     04/26/2023    MPV 10.1 04/26/2023      Lab Results   Component Value Date     04/26/2023    K 3.7 04/26/2023    CHLORIDE 105 04/26/2023    CO2 24 04/26/2023    GLUCOSE 107 (H) 04/26/2023    BUN 9.2 04/26/2023    CREATININE 0.73 04/26/2023    LABPROT 7.2 04/26/2023    ALBUMIN 3.8 04/26/2023    BILITOT 0.4 04/26/2023    ALKPHOS 120 04/26/2023    AST 16 04/26/2023    ALT 13 04/26/2023    EGFRNORACEVR >60 04/26/2023     Lab Results   Component Value Date    TSH 0.3962 10/17/2022     Lab Results   Component Value Date    CHOL 182 10/17/2022    HDL 40 10/17/2022    .00 10/17/2022    TRIG 79 10/17/2022     Lab Results   Component Value Date    COLORUA Light-Yellow 10/17/2022    SGUA 1.020 10/17/2022    PROTEINUA Negative 10/17/2022    GLUCOSEUA Normal 10/17/2022    BILIRUBINUA Negative 10/17/2022    BLOODUA 2+ (A) 10/17/2022    WBCUA 0-5 10/17/2022    RBCUA 6-10 (A) 10/17/2022    BACTERIA None Seen 10/17/2022    NITRITESUA Negative 10/17/2022    LEUKOCYTESUR Negative 10/17/2022    UROBILINOGEN Normal 10/17/2022     Lab Results   Component Value Date    CREATRANDUR 104.1 10/17/2022    MICALBCREAT 11.1 10/17/2022     Lab Results   Component Value Date    YSNKPHCD39CQ 13.5 (L) 04/26/2023     Lab Results   Component Value Date    HIV Nonreactive 10/17/2022    HEPAIGM Nonreactive 10/17/2022    HEPBCOREM Nonreactive 10/17/2022    HEPCAB Nonreactive 10/17/2022       Assessment and Plan (including Health Maintenance)     Problem List Items Addressed This Visit          Derm    Tinea pedis - Primary    Current Assessment & Plan     Wound culture obtained.  Referral to [facility] care clinic.  Continue nystatin powder, fluconazole, and clotrimazole.  RTC in 4 weeks for re-evaluation.  Dry feet, in between toes and  toenails thoroughly after bathing.   Dry feet and change socks frequently with excessive sweating.   Use/purchase tinactin powder spray; spray to affected area BID and keep toes  until area is completely dried then apply sheep skin to area.           Relevant Orders    Ambulatory referral/consult to Wound Clinic    Wound Culture       Cardiac/Vascular    Primary hypertension    Current Assessment & Plan     BP Readings from Last 3 Encounters:   06/20/23 123/81   06/19/23 (!) 151/96   06/07/23 124/84      At goal.  Follow a low sodium (less than 2 grams of sodium per day), DASH diet.   Continue amlodipine as prescribed.  Monitor blood pressure and report any consistent values greater than 140/90 and keep a log.  Maintain healthy weight with a BMI goal of <30.   Aerobic exercise for 150 minutes per week (or 5 days a week for 30 minutes each day).             Endocrine    BMI 40.0-44.9, adult    Current Assessment & Plan     Body mass index is 41.51 kg/m².  Goal BMI <30.  Aerobic exercise 150 minutes per week.  Avoid soda, simple sugars, sweets, excessive rice, pasta, potatoes or bread.   Choose brown options when available and portion control.  Limit fast foods and fried foods.   Choose complex carbs in moderation (ex: green, leafy vegetables, beans, oatmeal).  Eat plenty of fresh fruits and vegetables with lean meats daily.   Consider permanent healthy lifestyle changes.              Health Maintenance Due   Topic Date Due    COVID-19 Vaccine (6 - Pfizer series) 12/14/2021     All of your core healthy metrics are met.      Health Maintenance Topics with due status: Not Due       Topic Last Completion Date    TETANUS VACCINE 03/01/2018    Cervical Cancer Screening 02/01/2023    Hemoglobin A1c (Prediabetes) 04/26/2023       Future Appointments   Date Time Provider Department Center   7/26/2023  7:45 AM [facility]    9/7/2023  7:45 AM JOLENE Millan [facility]  [facility]on   2/6/2024 10:30 AM [facility]        Follow up in 4 weeks (on 7/18/2023) for F2F, Follow up tinea pedis.          Signature:        JOLENE Millan  [facility]  [address] [id number]    Date of encounter: 6/20/23

## 2023-06-20 NOTE — ASSESSMENT & PLAN NOTE
Wound culture obtained.  Referral to [facility] wound care clinic.  Continue nystatin powder, fluconazole, and clotrimazole.  RTC in 4 weeks for re-evaluation.  Dry feet, in between toes and toenails thoroughly after bathing.   Dry feet and change socks frequently with excessive sweating.   Use/purchase tinactin powder spray; spray to affected area BID and keep toes  until area is completely dried then apply sheep skin to area.

## 2023-06-20 NOTE — ASSESSMENT & PLAN NOTE
Body mass index is 41.51 kg/m².  Goal BMI <30.  Aerobic exercise 150 minutes per week.  Avoid soda, simple sugars, sweets, excessive rice, pasta, potatoes or bread.   Choose brown options when available and portion control.  Limit fast foods and fried foods.   Choose complex carbs in moderation (ex: green, leafy vegetables, beans, oatmeal).  Eat plenty of fresh fruits and vegetables with lean meats daily.   Consider permanent healthy lifestyle changes.

## 2023-06-20 NOTE — ASSESSMENT & PLAN NOTE
BP Readings from Last 3 Encounters:   06/20/23 123/81   06/19/23 (!) 151/96   06/07/23 124/84      At goal.  Follow a low sodium (less than 2 grams of sodium per day), DASH diet.   Continue amlodipine as prescribed.  Monitor blood pressure and report any consistent values greater than 140/90 and keep a log.  Maintain healthy weight with a BMI goal of <30.   Aerobic exercise for 150 minutes per week (or 5 days a week for 30 minutes each day).

## 2023-06-23 LAB — BACTERIA SPEC CULT: ABNORMAL

## 2023-06-23 RX ORDER — CIPROFLOXACIN 500 MG/1
500 TABLET ORAL EVERY 12 HOURS
Qty: 28 TABLET | Refills: 0 | Status: SHIPPED | OUTPATIENT
Start: 2023-06-23 | End: 2023-07-07

## 2023-06-23 NOTE — PROGRESS NOTES
Notified of  wound culture positive for pseudomonas bacteria.  Ciprofloxacin 500 mg bid x14 days. Patient is to take all medication and not skip any doses.   She voiced understanding.

## 2023-06-23 NOTE — PROGRESS NOTES
Notify patient that wound culture positive for pseudomonas bacteria. I did prescribe Ciprofloxacin 500 mg bid x14 days. Patient is to take all medication and not skip any doses.

## 2023-06-26 ENCOUNTER — PATIENT MESSAGE (OUTPATIENT)
Dept: INTERNAL MEDICINE | Facility: CLINIC | Age: 33
End: 2023-06-26

## 2023-10-11 ENCOUNTER — PATIENT MESSAGE (OUTPATIENT)
Dept: INTERNAL MEDICINE | Facility: CLINIC | Age: 33
End: 2023-10-11

## 2023-10-11 ENCOUNTER — OFFICE VISIT (OUTPATIENT)
Dept: INTERNAL MEDICINE | Facility: CLINIC | Age: 33
End: 2023-10-11

## 2023-10-11 VITALS
RESPIRATION RATE: 18 BRPM | HEIGHT: 58 IN | TEMPERATURE: 98 F | BODY MASS INDEX: 43.96 KG/M2 | SYSTOLIC BLOOD PRESSURE: 138 MMHG | HEART RATE: 78 BPM | OXYGEN SATURATION: 100 % | WEIGHT: 209.44 LBS | DIASTOLIC BLOOD PRESSURE: 79 MMHG

## 2023-10-11 DIAGNOSIS — R73.03 PREDIABETES: ICD-10-CM

## 2023-10-11 DIAGNOSIS — B35.3 TINEA PEDIS OF BOTH FEET: ICD-10-CM

## 2023-10-11 DIAGNOSIS — E55.9 VITAMIN D DEFICIENCY: ICD-10-CM

## 2023-10-11 DIAGNOSIS — M72.2 PLANTAR FASCIITIS, BILATERAL: ICD-10-CM

## 2023-10-11 DIAGNOSIS — I10 PRIMARY HYPERTENSION: ICD-10-CM

## 2023-10-11 DIAGNOSIS — Z00.00 WELL ADULT EXAM: Primary | ICD-10-CM

## 2023-10-11 DIAGNOSIS — E66.01 CLASS 3 SEVERE OBESITY DUE TO EXCESS CALORIES WITH SERIOUS COMORBIDITY AND BODY MASS INDEX (BMI) OF 40.0 TO 44.9 IN ADULT: ICD-10-CM

## 2023-10-11 PROBLEM — E66.813 CLASS 3 SEVERE OBESITY DUE TO EXCESS CALORIES WITH SERIOUS COMORBIDITY AND BODY MASS INDEX (BMI) OF 40.0 TO 44.9 IN ADULT: Status: ACTIVE | Noted: 2022-05-25

## 2023-10-11 PROCEDURE — 99214 PR OFFICE/OUTPT VISIT, EST, LEVL IV, 30-39 MIN: ICD-10-PCS | Mod: S$PBB,25,,

## 2023-10-11 PROCEDURE — 99215 OFFICE O/P EST HI 40 MIN: CPT | Mod: PBBFAC

## 2023-10-11 PROCEDURE — 99214 OFFICE O/P EST MOD 30 MIN: CPT | Mod: S$PBB,25,,

## 2023-10-11 PROCEDURE — 99395 PR PREVENTIVE VISIT,EST,18-39: ICD-10-PCS | Mod: S$PBB,,,

## 2023-10-11 PROCEDURE — 99395 PREV VISIT EST AGE 18-39: CPT | Mod: S$PBB,,,

## 2023-10-11 RX ORDER — ASPIRIN 325 MG
50000 TABLET, DELAYED RELEASE (ENTERIC COATED) ORAL
Qty: 12 CAPSULE | Refills: 0 | Status: SHIPPED | OUTPATIENT
Start: 2023-10-11 | End: 2024-01-03

## 2023-10-11 RX ORDER — METFORMIN HYDROCHLORIDE 500 MG/1
500 TABLET ORAL
Qty: 90 TABLET | Refills: 1 | Status: SHIPPED | OUTPATIENT
Start: 2023-10-11 | End: 2024-03-13 | Stop reason: SDUPTHER

## 2023-10-11 RX ORDER — AMLODIPINE BESYLATE 5 MG/1
5 TABLET ORAL DAILY
Qty: 90 TABLET | Refills: 1 | Status: SHIPPED | OUTPATIENT
Start: 2023-10-11 | End: 2024-03-13

## 2023-10-11 NOTE — ASSESSMENT & PLAN NOTE
Encouraged to wear shoes with proper arch support.  Maintain a BMI <30.  RICE encouraged.  Wear splint at night.  Roll foot on frozen water bottle to alleviate inflammation and promote stretching.  Take ibuprofen as directed - purchase OTC.   Will refer to podiatry once private insurance is obtained.

## 2023-10-11 NOTE — ASSESSMENT & PLAN NOTE
Lab Results   Component Value Date    [id number] 11.4 (L) 10/11/2023     Educated on increasing foods high in Vitamin D such as fish oil, cod liver oil, salmon, milk fortified with vitamin D.  RX Vitamin D3 05300 IU weekly x 12 weeks.  Complete entire 12 weeks of Vitamin D prescription.  After completion of prescription (12 weeks/3 months), begin taking Vitamin D 2000 I.U. tablets daily (purchase over the counter).  Repeat Vitamin D level as ordered.

## 2023-10-11 NOTE — PATIENT INSTRUCTIONS
REMINDER: Please complete labs within 1 week of appointment.   Please complete satisfaction survey when received. Thank you.    Trey Mcleod,     If you are due for any health screening(s) below please notify me so we can arrange them to be ordered and scheduled. Most healthy patients at your age complete them, but you are free to accept or refuse.     If you can't do it, I'll definitely understand. If you can, I'd certainly appreciate it!    All of your core healthy metrics are met.

## 2023-10-11 NOTE — PROGRESS NOTES
PATIENT NAME: Shani Suarez  : 1990  DATE: 10/11/23  MRN: [id number]          Reason for Visit/Chief Complaint   Plantar Fasciitis, Annual Exam, Hypertension, and Pre-diabetes       History of Present Illness (HPI)     Shani Suarez is a 33 y.o. Black female presenting in clinic today for Plantar Fasciitis, Annual Exam, Hypertension, and Pre-diabetes. PMH: HTN, chronic tinea pedis, chronic left calcaneous pain, and prediabetes. She currently followed by [facility] GYN clinic and previously followed by [facility] wound care. She does not qualify for medicaid, will obtain private insurance during open enrollment at her place of employment.     All pertinent labs dated 10/11/2023 and diagnostic tests reviewed and discussed with patient.     H/o chronic tinea pedis that has been ongoing for over a year. She was previously referred to Dr. Silveira, but never received an appt. On 2023, she presented in clinic with irritation and pain in feet. She reported an odor in toes. She stated using a blow dryer on feet after taking a bath. She reported using tinactin spray, anti-fungal powder, terbinafine 250 mg daily x 14 days in 2021, genitian stacey, CMPD (compound) of clindamycin, mupirocin, itraconazole, and streptomycin mixed in a diluent and sprayed to the toes BID by [facility]. She was referred to Dr. Nic Welch, given prescription for diflucan weekly x6 weeks, and nystatin powder. Xray of bilat feet were normal. On 2023, she presented for follow up and voiced compliance with nystatin powder and completed diflucan regimen. On 2023, patient presented to [facility] with c/o toe pain, skin cracking. She was also noted to be hypertensive during that visit - 151/96. She was prescribed fluconazole 150 mg weekly x4 weeks and clotrimazole cream. She was referred to [facility] wound care, but was a no show on 2023.    Denies smoking or illicit drug use. She drinks alcohol occasionally. Denies chest  pain, shortness of breath, cough, headache, dizziness, weakness, abdominal pain, nausea, vomiting, diarrhea, constipation, dysuria, depression, anxiety, SI, and HI.    Cervical Cancer Screening - Last PAP: 2023. Follow up annually for PAP/pelvic exam. Scheduled: 2024.  Breast Cancer Screening - Deferred due to age. Will initiate testing at age 40.  Colon Cancer Screening - Deferred due to age. Will initiate testing at age 45.  Vaccinations: Flu - 2022 / Tetanus - 3/1/2018 /COVID - 2020, 2021, 10/19/2021  Eye Exam - Several years. List of local eye doctors provided to patient.  Dental Exam - Several years. List of local dentists provided to patient.       Review of Systems     Review of Systems   Constitutional: Negative.    HENT: Negative.     Eyes: Negative.    Respiratory: Negative.     Cardiovascular: Negative.    Gastrointestinal: Negative.    Endocrine: Negative.    Genitourinary: Negative.    Musculoskeletal: Negative.         Bilat foot pain   Allergic/Immunologic: Negative.    Neurological: Negative.    Hematological: Negative.    Psychiatric/Behavioral: Negative.     All other systems reviewed and are negative.      Medical / Social / Family History     Past Medical History:   Diagnosis Date    AC (acromioclavicular) arthritis     Diabetes mellitus, type 2     Hyperlipidemia     Hypertension          Past Surgical History:   Procedure Laterality Date     SECTION           Social History  Shani Dempsey  reports that she has never smoked. She has never used smokeless tobacco. She reports that she does not currently use alcohol. She reports that she does not use drugs.    Family History  Shani Dempsey family history includes Diabetes in her mother; Hypertension in her father.    Medications and Allergies     Medications  Current Outpatient Medications   Medication Instructions    ACCU-CHEK GUIDE GLUCOSE METER [facility] [place] CHECK BLOOD GLUCOSE ONCE DAILY    ACCU-CHEK  "GUIDE TEST STRIPS [facility] TEST BLOOD GLUCOSE ONCE DAILY    ACCU-CHEK SOFTCLIX LANCETS Misc Topical (Top)    amLODIPine (NORVASC) 5 mg, Oral, Daily    cholecalciferol (vitamin D3) 50,000 Units, Oral, Every 7 days    metFORMIN (GLUCOPHAGE) 500 mg, Oral, With breakfast    norethindrone (ORTHO MICRONOR) 0.35 mg, Oral, Daily    nystatin (MYCOSTATIN) powder Topical (Top), 2 times daily    sumatriptan (IMITREX) 25 mg, Oral, Every 4-6 hours PRN, Dose may be repeated once after 2 hours of initial administration.       Allergies  Review of patient's allergies indicates:  No Known Allergies    Physical Examination   Visit Vitals  /79 (BP Location: Right arm, Patient Position: Sitting, BP Method: Large (Automatic))   Pulse 78   Temp 98.1 °F (36.7 °C) (Oral)   Resp 18   Ht 4' 10" (1.473 m)   Wt 95 kg (209 lb 7 oz)   SpO2 100%   BMI 43.77 kg/m²       Physical Exam  Vitals reviewed.   Constitutional:       Appearance: Normal appearance. She is normal weight.   HENT:      Head: Normocephalic and atraumatic.      Right Ear: External ear normal.      Left Ear: External ear normal.      Nose: Nose normal.      Mouth/Throat:      Mouth: Mucous membranes are moist.      Pharynx: Oropharynx is clear.   Eyes:      Extraocular Movements: Extraocular movements intact.      Conjunctiva/sclera: Conjunctivae normal.      Pupils: Pupils are equal, round, and reactive to light.   Cardiovascular:      Rate and Rhythm: Normal rate and regular rhythm.      Pulses: Normal pulses.      Heart sounds: Normal heart sounds.   Pulmonary:      Effort: Pulmonary effort is normal.      Breath sounds: Normal breath sounds.   Abdominal:      General: Bowel sounds are normal.      Palpations: Abdomen is soft.   Musculoskeletal:         General: Normal range of motion.      Cervical back: Normal range of motion and neck supple.   Skin:     General: Skin is warm and dry.      Capillary Refill: Capillary refill takes less than 2 seconds.   Neurological:      " General: No focal deficit present.      Mental Status: She is alert and oriented to person, place, and time.   Psychiatric:         Mood and Affect: Mood normal.         Behavior: Behavior normal.         Thought Content: Thought content normal.         Judgment: Judgment normal.           Results     Lab Results   Component Value Date    WBC 10.59 10/11/2023    RBC 4.31 10/11/2023    HGB 12.3 10/11/2023    HCT 36.5 (L) 10/11/2023    MCV 84.7 10/11/2023    MCH 28.5 10/11/2023    MCHC 33.7 10/11/2023    RDW 12.6 10/11/2023     10/11/2023    MPV 10.0 10/11/2023      Lab Results   Component Value Date     10/11/2023    K 4.3 10/11/2023    CHLORIDE 103 10/11/2023    CO2 27 10/11/2023    GLUCOSE 126 (H) 10/11/2023    BUN 9.9 10/11/2023    CREATININE 0.76 10/11/2023    LABPROT 7.0 10/11/2023    ALBUMIN 3.8 10/11/2023    BILITOT 0.3 10/11/2023    ALKPHOS 122 10/11/2023    AST 15 10/11/2023    ALT 13 10/11/2023    EGFRNORACEVR >60 10/11/2023     Lab Results   Component Value Date    TSH 0.724 10/11/2023     Lab Results   Component Value Date    CHOL 182 10/11/2023    HDL 43 10/11/2023    .00 10/11/2023    TRIG 86 10/11/2023     Lab Results   Component Value Date    COLORUA Light-Yellow 10/11/2023    SGUA 1.018 10/11/2023    PROTEINUA Negative 10/11/2023    GLUCOSEUA Normal 10/11/2023    BILIRUBINUA Negative 10/11/2023    BLOODUA Negative 10/11/2023    WBCUA 0-5 10/11/2023    RBCUA 0-5 10/11/2023    BACTERIA None Seen 10/11/2023    NITRITESUA Negative 10/11/2023    LEUKOCYTESUR Negative 10/11/2023    UROBILINOGEN Normal 10/11/2023     Lab Results   Component Value Date    CREATRANDUR 110.3 (H) 10/11/2023    MICALBCREAT 9.2 10/11/2023     Lab Results   Component Value Date    [id number] 11.4 (L) 10/11/2023     Lab Results   Component Value Date    HIV Nonreactive 10/17/2022    HEPAIGM Nonreactive 10/17/2022    HEPBCOREM Nonreactive 10/17/2022    HEPCAB Nonreactive 10/17/2022       Assessment and Plan  (including Health Maintenance)     Problem List Items Addressed This Visit          Derm    Tinea pedis    Current Assessment & Plan     Continue nystatin powder, fluconazole, and clotrimazole.  Will refer to podiatry when private insurance is obtained.  Dry feet, in between toes and toenails thoroughly after bathing.   Dry feet and change socks frequently with excessive sweating.   Use/purchase tinactin powder spray; spray to affected area BID and keep toes  until area is completely dried then apply sheep skin to area.              Cardiac/Vascular    Primary hypertension    Current Assessment & Plan     BP Readings from Last 3 Encounters:   10/11/23 138/79   06/20/23 123/81   06/19/23 (!) 151/96      At goal.  Follow a low sodium (less than 2 grams of sodium per day), DASH diet.   Continue amlodipine as prescribed - refilled today.  Monitor blood pressure and report any consistent values greater than 140/90 and keep a log.  Maintain healthy weight with a BMI goal of <30.   Aerobic exercise for 150 minutes per week (or 5 days a week for 30 minutes each day).          Relevant Medications    amLODIPine (NORVASC) 5 MG tablet    Other Relevant Orders    CBC Auto Differential    Comprehensive Metabolic Panel    Microalbumin/Creatinine Ratio, Urine    Urinalysis, Reflex to Urine Culture       Endocrine    Class 3 severe obesity due to excess calories with serious comorbidity and body mass index (BMI) of 40.0 to 44.9 in adult    Overview     The patient presents with obesity.  Denies bulimia, amenorrhea, cold intolerance, edema, hip pain, hirsutism, knee pain, polydipsia, polyuria, thirst and weakness.  The patient does not perform regular exercise.  Previous treatments for obesity :self-directed dieting without success.  The patient and I discussed the importance of exercise and healthy diet with portion control.  Wt Readings from Last 4 Encounters:   12/29/21 91.7 kg (202 lb 2.6 oz)            Current  Assessment & Plan     Body mass index is 43.77 kg/m².  Goal BMI <30.  Aerobic exercise 150 minutes per week.  Avoid soda, simple sugars, sweets, excessive rice, pasta, potatoes or bread.   Choose brown options when available and portion control.  Limit fast foods and fried foods.   Choose complex carbs in moderation (ex: green, leafy vegetables, beans, oatmeal).  Eat plenty of fresh fruits and vegetables with lean meats daily.   Consider permanent healthy lifestyle changes.          Prediabetes    Current Assessment & Plan     Lab Results   Component Value Date    HGBA1C 6.0 10/11/2023   Continue metformin as prescribed - refilled today.  Avoid soda, simple sweets, and limit rice/pasta/bread/starches and consume brown options when possible.    Maintain healthy weight with BMI goal <30.    Perform aerobic exercise for 150 minutes per week (or 5 days a week for 30 minutes each day).           Relevant Medications    metFORMIN (GLUCOPHAGE) 500 MG tablet    Other Relevant Orders    Hemoglobin A1C    Vitamin D deficiency    Current Assessment & Plan     Lab Results   Component Value Date    [id number] 11.4 (L) 10/11/2023   Educated on increasing foods high in Vitamin D such as fish oil, cod liver oil, salmon, milk fortified with vitamin D.  RX Vitamin D3 91644 IU weekly x 12 weeks.  Complete entire 12 weeks of Vitamin D prescription.  After completion of prescription (12 weeks/3 months), begin taking Vitamin D 2000 I.U. tablets daily (purchase over the counter).  Repeat Vitamin D level as ordered.          Relevant Medications    cholecalciferol, vitamin D3, 1,250 mcg (50,000 unit) capsule    Other Relevant Orders    Vitamin D       Orthopedic    Plantar fasciitis, bilateral    Current Assessment & Plan     Encouraged to wear shoes with proper arch support.  Maintain a BMI <30.  RICE encouraged.  Wear splint at night.  Roll foot on frozen water bottle to alleviate inflammation and promote stretching.  Take ibuprofen as  directed - purchase OTC.   Will refer to podiatry once private insurance is obtained.            Other    Well adult exam - Primary    Current Assessment & Plan     Wellness labs - 10/11/2023.   Cervical Cancer Screening - Last PAP: 2/1/2023. Follow up annually for PAP/pelvic exam. Scheduled: 2/6/2024.  Breast Cancer Screening - Deferred due to age. Will initiate testing at age 40.  Colon Cancer Screening - Deferred due to age. Will initiate testing at age 45.  Vaccinations: Flu - 9/21/2022 / Tetanus - 3/1/2018 /COVID - 12/9/2020, 1/19/2021, 10/19/2021  Eye Exam - Several years. List of local eye doctors provided to patient.  Dental Exam - Several years. List of local dentists provided to patient.               Health Maintenance Due   Topic Date Due    COVID-19 Vaccine (7 - 2023-24 season) 09/01/2023     All of your core healthy metrics are met.      Health Maintenance Topics with due status: Not Due       Topic Last Completion Date    TETANUS VACCINE 03/01/2018    Cervical Cancer Screening 02/01/2023    Hemoglobin A1c (Prediabetes) 10/11/2023       Future Appointments   Date Time Provider Department Center   2/6/2024 10:30 AM Kayli Crespo FNP [facility]   4/11/2024  7:15 AM Juhi Dumont FNP [facility] [place][note 2]        Follow up in about 6 months (around 4/11/2024) for F2F, Follow up, Med check, Lab review, RTC PRN.          Signature:        JOLENE Millan  [facility]  [address] [facility] [address]    Date of encounter: 10/11/23

## 2023-10-11 NOTE — ASSESSMENT & PLAN NOTE
Wellness labs - 10/11/2023.   Cervical Cancer Screening - Last PAP: 2/1/2023. Follow up annually for PAP/pelvic exam. Scheduled: 2/6/2024.  Breast Cancer Screening - Deferred due to age. Will initiate testing at age 40.  Colon Cancer Screening - Deferred due to age. Will initiate testing at age 45.  Vaccinations: Flu - 9/21/2022 / Tetanus - 3/1/2018 /COVID - 12/9/2020, 1/19/2021, 10/19/2021  Eye Exam - Several years. List of local eye doctors provided to patient.  Dental Exam - Several years. List of local dentists provided to patient.

## 2023-10-11 NOTE — ASSESSMENT & PLAN NOTE
Continue nystatin powder, fluconazole, and clotrimazole.  Will refer to podiatry when private insurance is obtained.  Dry feet, in between toes and toenails thoroughly after bathing.   Dry feet and change socks frequently with excessive sweating.   Use/purchase tinactin powder spray; spray to affected area BID and keep toes  until area is completely dried then apply sheep skin to area.

## 2023-10-11 NOTE — ASSESSMENT & PLAN NOTE
BP Readings from Last 3 Encounters:   10/11/23 138/79   06/20/23 123/81   06/19/23 (!) 151/96      At goal.  Follow a low sodium (less than 2 grams of sodium per day), DASH diet.   Continue amlodipine as prescribed - refilled today.  Monitor blood pressure and report any consistent values greater than 140/90 and keep a log.  Maintain healthy weight with a BMI goal of <30.   Aerobic exercise for 150 minutes per week (or 5 days a week for 30 minutes each day).

## 2023-10-11 NOTE — ASSESSMENT & PLAN NOTE
Lab Results   Component Value Date    HGBA1C 6.0 10/11/2023     Continue metformin as prescribed - refilled today.  Avoid soda, simple sweets, and limit rice/pasta/bread/starches and consume brown options when possible.    Maintain healthy weight with BMI goal <30.    Perform aerobic exercise for 150 minutes per week (or 5 days a week for 30 minutes each day).

## 2023-10-11 NOTE — ASSESSMENT & PLAN NOTE
Body mass index is 43.77 kg/m².  Goal BMI <30.  Aerobic exercise 150 minutes per week.  Avoid soda, simple sugars, sweets, excessive rice, pasta, potatoes or bread.   Choose brown options when available and portion control.  Limit fast foods and fried foods.   Choose complex carbs in moderation (ex: green, leafy vegetables, beans, oatmeal).  Eat plenty of fresh fruits and vegetables with lean meats daily.   Consider permanent healthy lifestyle changes.    [facility]  Discharge Note    Baby Boy Sharon Islas is a 3days old male born on 2021 with Prenatally diagnosed abnormality of both kidneys. Patient has maintained good urine output but his creatinine level  Danyelle from 1.5 to 1.8 in the past 24 hours and under the of the on call Pediatric urologist on call at [facility], pt is being transferred for further care and management. Prenatal history and labs are:    Information for the patient's mother:  Fiona Ring [[id number]]   25 y.o.   OB History        3    Para   3    Term   3            AB        Living   2       SAB        IAB        Ectopic        Molar        Multiple   0    Live Births   2               41w1d   O POSITIVE    No results found for: RPR, RUBELLAIGGQT, HEPBSAG, HIV1X2     Delivery Information:     Information for the patient's mother:  Fiona Ring [[id number]]         Information:                                       Weight - Scale: 7 lb 15.9 oz (3.626 kg) (3626 grams)    Feeding Method Used: Breastfeeding    Pregnancy history, family history and nursing notes reviewed. .  Vital Signs:  Birth Weight: 8 lb 14.1 oz (4.029 kg)  Pulse 128   Temp 98.4 °F (36.9 °C)   Resp 46   Ht 22\" (55.9 cm) Comment: Filed from Delivery Summary  Wt 7 lb 15.9 oz (3.626 kg) Comment: 3626 grams  HC 36 cm (14.17\") Comment: Filed from Delivery Summary  BMI 11.61 kg/m²       Wt Readings from Last 3 Encounters:   21 7 lb 15.9 oz (3.626 kg) (66 %, Z= 0.41)*     * Growth percentiles are based on WHO (Boys, 0-2 years) data. The Percent Change in weight from birth weight is -10%       Physical Exam:    Constitutional: Alert, vigorous. No distress. Head: Normocephalic. Normal fontanelles. No facial anomaly. Ears: External ears normal.   Nose: Nostrils without airway obstruction. Mouth/Throat: Mucous membranes are moist. Palate intact. Oropharynx is clear.    Eyes: Red reflex is present bilaterally. Neck: Full passive range of motion. Clavicles: Intact  Cardiovascular: Normal rate, regular rhythm, S1 and S2 normal, no murmur. Pulses are palpable. Pulmonary/Chest: Clear to ausculation bilaterally. No respiratory distress. Abdominal: Soft. Bowel sounds are normal. No distension, masses or organomegaly. Umbilicus normal. No tenderness, rigidity or guarding. No hernia. Genitourinary: Normal male genitalia. Musculoskeletal: Normal ROM. Hips stable. Back: Straight, no defects   Neurological: Alert during exam. Tone normal for gestation. Normal grasp, suck, symmetric Nora. Skin: Skin is warm and dry. Capillary refill less than 3 seconds. Turgor is normal. No rash noted. No cyanosis, mottling, or pallor.  No jaundice    Recent Labs:   Admission on 2021   Component Date Value Ref Range Status    ABO/Rh 2021 O NEGATIVE   Final    Direct Chris 2021 NEGATIVE   Final    Du Antigen 2021 NEGATIVE   Final    Cannabinoid Scrn, Ur 2021 NEGATIVE  NEGATIVE Final    Amphetamines 2021 NEGATIVE  NEGATIVE Final    Cocaine Metabolite 2021 NEGATIVE  NEGATIVE Final    Benzodiazepine Screen, Urine 2021 NEGATIVE  NEGATIVE Final    Barbiturate Screen, Ur 2021 NEGATIVE  NEGATIVE Final    Opiates, Urine 2021 NEGATIVE  NEGATIVE Final    Phencyclidine, Urine 2021 NEGATIVE  NEGATIVE Final    Oxycodone 2021 NEGATIVE  NEGATIVE Final    POC Glucose 2021 71* 40 - 60 MG/DL Final    POC Glucose 2021 62* 40 - 60 MG/DL Final    POC Glucose 2021 79* 40 - 60 MG/DL Final    Sodium 2021 150* 132 - 140 MMOL/L Final    Potassium 2021 5.3  5.0 - 7.7 MMOL/L Final    Chloride 2021 112* 99 - 110 mMol/L Final    CO2 2021 19* 20 - 28 MMOL/L Final    Anion Gap 2021 19* 4 - 16 Final    BUN 2021 13  6 - 23 MG/DL Final    CREATININE 2021 1.5* 0.9 - 1.3 MG/DL Final    Glucose 2021 62  50 - 99 MG/DL Final    Calcium 2021  7.0 - 12.0 MG/DL Final    POC Glucose 2021 72  50 - 99 MG/DL Final    Sodium 2021 149* 132 - 140 MMOL/L Final    Potassium 2021* 4.0 - 6.4 MMOL/L Final    Chloride 2021 115* 99 - 110 mMol/L Final    CO2 2021 20  20 - 28 MMOL/L Final    Anion Gap 2021 14  4 - 16 Final    BUN 2021 14  6 - 23 MG/DL Final    CREATININE 2021* 0.9 - 1.3 MG/DL Final    Glucose 2021 83  50 - 99 MG/DL Final    Calcium 2021  8.3 - 10.6 MG/DL Final      Immunization History   Administered Date(s) Administered    Hepatitis B Ped/Adol (Engerix-B, Recombivax HB) 2021       Transcutaneous bilirubin: 1.1    Hearing Screen Result: Passed. Patient Active Problem List    Diagnosis Date Noted    Term  delivered vaginally, current hospitalization 2021    Multicystic dysplastic kidney 2021    Hydronephrosis determined by ultrasound 2021       Nutrition: Breast Milk    Assessment:  2 days day old term AGA infant male, in stable condition. .    Plan:  1. Transfer to [facility] NICU. 2. Transfer was dicussed with Mother and consent for transfer was obtained.        Electronically signed at 2:52 PM by Catrachito Vargas MD, MD

## 2024-01-15 PROBLEM — Z00.00 WELL ADULT EXAM: Status: RESOLVED | Noted: 2023-10-11 | Resolved: 2024-01-15

## 2024-03-13 ENCOUNTER — OFFICE VISIT (OUTPATIENT)
Dept: INTERNAL MEDICINE | Facility: CLINIC | Age: 34
End: 2024-03-13
Payer: COMMERCIAL

## 2024-03-13 ENCOUNTER — PATIENT MESSAGE (OUTPATIENT)
Dept: INTERNAL MEDICINE | Facility: CLINIC | Age: 34
End: 2024-03-13

## 2024-03-13 VITALS
BODY MASS INDEX: 43.83 KG/M2 | HEIGHT: 58 IN | HEART RATE: 80 BPM | SYSTOLIC BLOOD PRESSURE: 140 MMHG | WEIGHT: 208.81 LBS | DIASTOLIC BLOOD PRESSURE: 92 MMHG | TEMPERATURE: 98 F | OXYGEN SATURATION: 99 % | RESPIRATION RATE: 18 BRPM

## 2024-03-13 DIAGNOSIS — R73.03 PREDIABETES: ICD-10-CM

## 2024-03-13 DIAGNOSIS — M79.671 PAIN OF BOTH HEELS: ICD-10-CM

## 2024-03-13 DIAGNOSIS — M79.672 PAIN OF BOTH HEELS: ICD-10-CM

## 2024-03-13 DIAGNOSIS — I10 PRIMARY HYPERTENSION: ICD-10-CM

## 2024-03-13 DIAGNOSIS — B35.3 TINEA PEDIS OF BOTH FEET: Primary | ICD-10-CM

## 2024-03-13 DIAGNOSIS — E66.01 CLASS 3 SEVERE OBESITY DUE TO EXCESS CALORIES WITH SERIOUS COMORBIDITY AND BODY MASS INDEX (BMI) OF 40.0 TO 44.9 IN ADULT: ICD-10-CM

## 2024-03-13 PROCEDURE — 1159F MED LIST DOCD IN RCRD: CPT | Mod: CPTII,,,

## 2024-03-13 PROCEDURE — 3008F BODY MASS INDEX DOCD: CPT | Mod: CPTII,,,

## 2024-03-13 PROCEDURE — 1160F RVW MEDS BY RX/DR IN RCRD: CPT | Mod: CPTII,,,

## 2024-03-13 PROCEDURE — 3077F SYST BP >= 140 MM HG: CPT | Mod: CPTII,,,

## 2024-03-13 PROCEDURE — 99214 OFFICE O/P EST MOD 30 MIN: CPT | Mod: S$PBB,,,

## 2024-03-13 PROCEDURE — 3080F DIAST BP >= 90 MM HG: CPT | Mod: CPTII,,,

## 2024-03-13 PROCEDURE — 99215 OFFICE O/P EST HI 40 MIN: CPT | Mod: PBBFAC

## 2024-03-13 RX ORDER — METFORMIN HYDROCHLORIDE 500 MG/1
500 TABLET ORAL
Qty: 90 TABLET | Refills: 2 | Status: SHIPPED | OUTPATIENT
Start: 2024-03-13 | End: 2025-03-13

## 2024-03-13 RX ORDER — AMLODIPINE BESYLATE 10 MG/1
10 TABLET ORAL DAILY
Qty: 90 TABLET | Refills: 1 | Status: SHIPPED | OUTPATIENT
Start: 2024-03-13 | End: 2025-03-13

## 2024-03-13 RX ORDER — NYSTATIN 100000 [USP'U]/G
POWDER TOPICAL 2 TIMES DAILY
Qty: 60 G | Refills: 2 | Status: SHIPPED | OUTPATIENT
Start: 2024-03-13 | End: 2025-03-13

## 2024-03-13 RX ORDER — FLUCONAZOLE 100 MG/1
100 TABLET ORAL
Qty: 3 TABLET | Refills: 0 | Status: SHIPPED | OUTPATIENT
Start: 2024-03-13 | End: 2024-04-11 | Stop reason: ALTCHOICE

## 2024-03-13 NOTE — PROGRESS NOTES
PATIENT NAME: Shani Suarez  : 1990  DATE: 3/13/24  MRN: [id number]          Reason for Visit/Chief Complaint   Tinea Pedis, Heel Pain, and Hypertension       History of Present Illness (HPI)     Shani Suarez is a 33 y.o. Black female presenting in clinic today for Tinea Pedis, Heel Pain, and Hypertension PMH: HTN, chronic tinea pedis, chronic left calcaneous pain, and prediabetes. She currently followed by [facility] and previously followed by [facility] wound care. She does not qualify for medicaid, will obtain private insurance during open enrollment at her place of employment.      H/o chronic tinea pedis that has been ongoing for approximately 2 years. She was previously referred to Dr. Silveira, but never received an appt. On 2023, she presented in clinic with irritation and pain in feet. She reported an odor in toes. She stated using a blow dryer on feet after taking a bath. She reported using tinactin spray, anti-fungal powder, terbinafine 250 mg daily x 14 days in 2021, genitian stacey, CMPD (compound) of clindamycin, mupirocin, itraconazole, and streptomycin mixed in a diluent and sprayed to the toes BID by [facility]. She was referred to Dr. Nic Welch, given prescription for diflucan weekly x6 weeks, and nystatin powder. She never did see Dr. Welch. Xray of bilat feet were normal. On 2023, she presented for follow up and voiced compliance with nystatin powder and completed diflucan regimen. On 2023, patient presented to [facility] with c/o toe pain, skin cracking. She was also noted to be hypertensive during that visit - 151/96. She was prescribed fluconazole 150 mg weekly x4 weeks and clotrimazole cream. She was referred to [facility] wound care, but was a no show on 2023. Today, she reports same symptoms of sores in between toes with pain in bilateral heels. She reports being out of nystatin powder.     BP-140/92, 150/90 - asymptomatic. Patient states she  ""fell off" and is not taking medications as prescribed, not exercising, and not following a low fat/low sodium diet.      Denies smoking or illicit drug use. She drinks alcohol occasionally. Denies chest pain, shortness of breath, cough, headache, dizziness, weakness, abdominal pain, nausea, vomiting, diarrhea, constipation, dysuria, depression, anxiety, SI, and HI.     Cervical Cancer Screening - Last PAP: 2023. Follow up annually for PAP/pelvic exam. Scheduled: 2024.  Breast Cancer Screening - Deferred due to age. Will initiate testing at age 40.  Colon Cancer Screening - Deferred due to age. Will initiate testing at age 45.  Vaccinations: Flu - 2022 / Tetanus - 3/1/2018 /COVID - 2020, 2021, 10/19/2021  Eye Exam - Several years. List of local eye doctors provided to patient.  Dental Exam - Several years. List of local dentists provided to patient.         Review of Systems     Review of Systems   Constitutional: Negative.    HENT: Negative.     Eyes: Negative.    Respiratory: Negative.     Cardiovascular: Negative.    Gastrointestinal: Negative.    Endocrine: Negative.    Genitourinary: Negative.    Musculoskeletal: Negative.    Skin:  Positive for wound.   Allergic/Immunologic: Negative.    Neurological: Negative.    Hematological: Negative.    Psychiatric/Behavioral: Negative.     All other systems reviewed and are negative.      Medical / Social / Family History     Past Medical History:   Diagnosis Date    AC (acromioclavicular) arthritis     Diabetes mellitus, type 2     Hyperlipidemia     Hypertension          Past Surgical History:   Procedure Laterality Date     SECTION           Social History  Shani Tabitha Suarez's  reports that she has never smoked. She has never used smokeless tobacco. She reports that she does not currently use alcohol. She reports that she does not use drugs.    Family History  Shani Tabitha Suarez's family history includes Diabetes in her mother; " "Hypertension in her father.    Medications and Allergies     Medications  Current Outpatient Medications   Medication Instructions    ACCU-CHEK GUIDE GLUCOSE METER Misc CHECK BLOOD GLUCOSE ONCE DAILY    ACCU-CHEK GUIDE TEST STRIPS Strp TEST BLOOD GLUCOSE ONCE DAILY    ACCU-CHEK SOFTCLIX LANCETS Misc Topical (Top)    amLODIPine (NORVASC) 10 mg, Oral, Daily    fluconazole (DIFLUCAN) 100 mg, Oral, Every 72 hours    metFORMIN (GLUCOPHAGE) 500 mg, Oral, With breakfast    nystatin (MYCOSTATIN) powder Topical (Top), 2 times daily       Allergies  Review of patient's allergies indicates:  No Known Allergies    Physical Examination   Visit Vitals  BP (!) 140/92 (BP Location: Left arm, Patient Position: Sitting, BP Method: Large (Manual))   Pulse 80   Temp 98.2 °F (36.8 °C) (Oral)   Resp 18   Ht 4' 10" (1.473 m)   Wt 94.7 kg (208 lb 12.8 oz)   SpO2 99%   BMI 43.64 kg/m²     Physical Exam  Vitals reviewed.   Constitutional:       Appearance: Normal appearance. She is normal weight.   HENT:      Head: Normocephalic and atraumatic.      Right Ear: External ear normal.      Left Ear: External ear normal.      Nose: Nose normal.      Mouth/Throat:      Mouth: Mucous membranes are moist.      Pharynx: Oropharynx is clear.   Eyes:      Extraocular Movements: Extraocular movements intact.      Conjunctiva/sclera: Conjunctivae normal.      Pupils: Pupils are equal, round, and reactive to light.   Cardiovascular:      Rate and Rhythm: Normal rate and regular rhythm.      Pulses: Normal pulses.      Heart sounds: Normal heart sounds.   Pulmonary:      Effort: Pulmonary effort is normal.      Breath sounds: Normal breath sounds.   Abdominal:      General: Bowel sounds are normal.      Palpations: Abdomen is soft.   Musculoskeletal:         General: Normal range of motion.      Cervical back: Normal range of motion and neck supple.   Feet:      Right foot:      Skin integrity: Skin breakdown and fissure present.      Left foot:      Skin " integrity: Skin breakdown and fissure present.      Comments: Maceration in between 3rd and 4th and 4th and 5th toes. No foul odor.  Skin:     General: Skin is warm and dry.      Capillary Refill: Capillary refill takes less than 2 seconds.   Neurological:      General: No focal deficit present.      Mental Status: She is alert and oriented to person, place, and time.   Psychiatric:         Mood and Affect: Mood normal.         Behavior: Behavior normal.         Thought Content: Thought content normal.         Judgment: Judgment normal.           Results     Lab Results   Component Value Date    WBC 10.59 10/11/2023    RBC 4.31 10/11/2023    HGB 12.3 10/11/2023    HCT 36.5 (L) 10/11/2023    MCV 84.7 10/11/2023    MCH 28.5 10/11/2023    MCHC 33.7 10/11/2023    RDW 12.6 10/11/2023     10/11/2023    MPV 10.0 10/11/2023      Lab Results   Component Value Date     10/11/2023    K 4.3 10/11/2023    CHLORIDE 103 10/11/2023    CO2 27 10/11/2023    GLUCOSE 126 (H) 10/11/2023    BUN 9.9 10/11/2023    CREATININE 0.76 10/11/2023    LABPROT 7.0 10/11/2023    ALBUMIN 3.8 10/11/2023    BILITOT 0.3 10/11/2023    ALKPHOS 122 10/11/2023    AST 15 10/11/2023    ALT 13 10/11/2023    EGFRNORACEVR >60 10/11/2023     Lab Results   Component Value Date    TSH 0.724 10/11/2023     Lab Results   Component Value Date    CHOL 182 10/11/2023    HDL 43 10/11/2023    .00 10/11/2023    TRIG 86 10/11/2023     Lab Results   Component Value Date    COLORUA Light-Yellow 10/11/2023    SGUA 1.018 10/11/2023    PROTEINUA Negative 10/11/2023    GLUCOSEUA Normal 10/11/2023    BILIRUBINUA Negative 10/11/2023    BLOODUA Negative 10/11/2023    WBCUA 0-5 10/11/2023    RBCUA 0-5 10/11/2023    BACTERIA None Seen 10/11/2023    NITRITESUA Negative 10/11/2023    LEUKOCYTESUR Negative 10/11/2023    UROBILINOGEN Normal 10/11/2023     Lab Results   Component Value Date    CREATRANDUR 110.3 (H) 10/11/2023    MICALBCREAT 9.2 10/11/2023     Lab  "Results   Component Value Date    [id number] 11.4 (L) 10/11/2023     Lab Results   Component Value Date    HIV Nonreactive 10/17/2022    HEPAIGM Nonreactive 10/17/2022    HEPBCOREM Nonreactive 10/17/2022    HEPCAB Nonreactive 10/17/2022     No results found for: "FITDIAG", "COLOGUARD"  No results found for: "OCCBLDIA"    Assessment and Plan (including Health Maintenance)     Problem List Items Addressed This Visit          Derm    Tinea pedis - Primary    Current Assessment & Plan     Rx diflucan 100 mg Q72 hours x3.  Continue nystatin powder - refilled today.   Referral to [facility] to eval and treat.  Dry feet, in between toes and toenails thoroughly after bathing.   Dry feet and change socks frequently with excessive sweating.   Use/purchase tinactin powder spray; spray to affected area BID and keep toes  until area is completely dried.         Relevant Medications    fluconazole (DIFLUCAN) 100 MG tablet    nystatin (MYCOSTATIN) powder    Other Relevant Orders    Ambulatory referral/consult to Podiatry       Cardiac/Vascular    Primary hypertension    Current Assessment & Plan     BP Readings from Last 3 Encounters:   03/13/24 (!) 140/92   10/11/23 138/79   06/20/23 123/81      Not at goal.  Increase amlodipine to 10 mg daily.  Follow a low sodium (less than 2 grams of sodium per day), DASH diet.   Monitor blood pressure and report any consistent values greater than 140/90 and keep a log.  Maintain healthy weight with a BMI goal of <30.   Aerobic exercise for 150 minutes per week (or 5 days a week for 30 minutes each day).          Relevant Medications    amLODIPine (NORVASC) 10 MG tablet       Endocrine    Class 3 severe obesity due to excess calories with serious comorbidity and body mass index (BMI) of 40.0 to 44.9 in adult    Current Assessment & Plan     Body mass index is 43.64 kg/m².  Goal BMI <30.  Aerobic exercise 150 minutes per week.  Avoid soda, simple sugars, sweets, excessive rice, " pasta, potatoes or bread.   Choose brown options when available and portion control.  Limit fast foods and fried foods.   Choose complex carbs in moderation (ex: green, leafy vegetables, beans, oatmeal).  Eat plenty of fresh fruits and vegetables with lean meats daily.   Consider permanent healthy lifestyle changes.          Prediabetes    Current Assessment & Plan     HgbA1c ordered for next office visit.  Continue metformin as prescribed - refilled today.  Patient to consider ozempic.  Avoid soda, simple sweets, and limit rice/pasta/bread/starches and consume brown options when possible.    Maintain healthy weight with BMI goal <30.    Perform aerobic exercise for 150 minutes per week (or 5 days a week for 30 minutes each day).           Relevant Medications    metFORMIN (GLUCOPHAGE) 500 MG tablet       Orthopedic    Pain of both heels    Overview     EXAMINATION:  XR FOOT COMPLETE 3 VIEW BILATERAL     CLINICAL HISTORY:  Tinea pedis     COMPARISON:  None.     FINDINGS:  No acute displaced fractures or dislocations.     Articular spaces are essentially preserved with smooth articular surfaces     No blastic or lytic lesions.  Soft tissues within normal limits.     Impression:  No acute osseous abnormality.       EXAMINATION:  XR CALCANEUS 2 VIEW LEFT     CLINICAL HISTORY:  Pain in left foot     COMPARISON:  None.     FINDINGS:  No acute displaced fractures or dislocations.     Joint spaces preserved.     No blastic or lytic lesions.     Small anterior calcaneal spur is identified small enthesopathy in the Achilles tendon insertion     Impression:     Anterior calcaneal spur with enthesopathy of the Achilles tendon insertion         Current Assessment & Plan     Referral to [facility] to eval and treat.  Ok to take alternate tylenol and ibuprofen for pain. Purchase OTC.           Relevant Orders    Ambulatory referral/consult to Podiatry        Health Maintenance Due   Topic Date Due    COVID-19 Vaccine (7 -  2023-24 season) 09/01/2023     Tests to Keep You Healthy    Cervical Cancer Screening: Met on 2/1/2023  Last Blood Pressure <= 139/89 (3/13/2024): NO      Health Maintenance Topics with due status: Not Due       Topic Last Completion Date    TETANUS VACCINE 03/01/2018    Cervical Cancer Screening 02/01/2023    Hemoglobin A1c (Prediabetes) 10/11/2023       Future Appointments   Date Time Provider Department Center   4/11/2024  7:20 AM [facility]   5/23/2024  2:00 PM Kayli Crespo, JOLENE [facility]        Follow up if symptoms worsen or fail to improve, keep scheduled appt on 4/11/2024.          Signature:        JOLENE Millan  [facility]  [address] [id number]    Date of encounter: 3/13/24

## 2024-03-13 NOTE — ASSESSMENT & PLAN NOTE
Body mass index is 43.64 kg/m².  Goal BMI <30.  Aerobic exercise 150 minutes per week.  Avoid soda, simple sugars, sweets, excessive rice, pasta, potatoes or bread.   Choose brown options when available and portion control.  Limit fast foods and fried foods.   Choose complex carbs in moderation (ex: green, leafy vegetables, beans, oatmeal).  Eat plenty of fresh fruits and vegetables with lean meats daily.   Consider permanent healthy lifestyle changes.

## 2024-03-13 NOTE — ASSESSMENT & PLAN NOTE
Rx diflucan 100 mg Q72 hours x3.  Continue nystatin powder - refilled today.   Referral to [facility] to eval and treat.  Dry feet, in between toes and toenails thoroughly after bathing.   Dry feet and change socks frequently with excessive sweating.   Use/purchase tinactin powder spray; spray to affected area BID and keep toes  until area is completely dried.

## 2024-03-13 NOTE — ASSESSMENT & PLAN NOTE
HgbA1c ordered for next office visit.  Continue metformin as prescribed - refilled today.  Patient to consider ozempic.  Avoid soda, simple sweets, and limit rice/pasta/bread/starches and consume brown options when possible.    Maintain healthy weight with BMI goal <30.    Perform aerobic exercise for 150 minutes per week (or 5 days a week for 30 minutes each day).

## 2024-03-13 NOTE — ASSESSMENT & PLAN NOTE
Referral to [facility] to eval and treat.  Ok to take alternate tylenol and ibuprofen for pain. Purchase OTC.

## 2024-03-13 NOTE — ASSESSMENT & PLAN NOTE
BP Readings from Last 3 Encounters:   03/13/24 (!) 140/92   10/11/23 138/79   06/20/23 123/81      Not at goal.  Increase amlodipine to 10 mg daily.  Follow a low sodium (less than 2 grams of sodium per day), DASH diet.   Monitor blood pressure and report any consistent values greater than 140/90 and keep a log.  Maintain healthy weight with a BMI goal of <30.   Aerobic exercise for 150 minutes per week (or 5 days a week for 30 minutes each day).

## 2024-04-11 ENCOUNTER — OFFICE VISIT (OUTPATIENT)
Dept: INTERNAL MEDICINE | Facility: CLINIC | Age: 34
End: 2024-04-11
Payer: COMMERCIAL

## 2024-04-11 ENCOUNTER — PATIENT MESSAGE (OUTPATIENT)
Dept: INTERNAL MEDICINE | Facility: CLINIC | Age: 34
End: 2024-04-11

## 2024-04-11 ENCOUNTER — LAB VISIT (OUTPATIENT)
Dept: LAB | Facility: HOSPITAL | Age: 34
End: 2024-04-11
Payer: COMMERCIAL

## 2024-04-11 VITALS
DIASTOLIC BLOOD PRESSURE: 86 MMHG | SYSTOLIC BLOOD PRESSURE: 137 MMHG | HEIGHT: 58 IN | BODY MASS INDEX: 43.87 KG/M2 | WEIGHT: 209 LBS | HEART RATE: 86 BPM | TEMPERATURE: 98 F | OXYGEN SATURATION: 99 % | RESPIRATION RATE: 20 BRPM

## 2024-04-11 DIAGNOSIS — R73.03 PREDIABETES: ICD-10-CM

## 2024-04-11 DIAGNOSIS — I10 PRIMARY HYPERTENSION: Primary | ICD-10-CM

## 2024-04-11 DIAGNOSIS — E55.9 VITAMIN D DEFICIENCY: ICD-10-CM

## 2024-04-11 DIAGNOSIS — E66.01 CLASS 3 SEVERE OBESITY DUE TO EXCESS CALORIES WITH SERIOUS COMORBIDITY AND BODY MASS INDEX (BMI) OF 40.0 TO 44.9 IN ADULT: ICD-10-CM

## 2024-04-11 DIAGNOSIS — B35.3 TINEA PEDIS OF BOTH FEET: ICD-10-CM

## 2024-04-11 DIAGNOSIS — I10 PRIMARY HYPERTENSION: ICD-10-CM

## 2024-04-11 LAB
ALBUMIN SERPL-MCNC: 3.8 G/DL (ref 3.5–5)
ALBUMIN/GLOB SERPL: 1 RATIO (ref 1.1–2)
ALP SERPL-CCNC: 147 UNIT/L (ref 40–150)
ALT SERPL-CCNC: 20 UNIT/L (ref 0–55)
APPEARANCE UR: CLEAR
AST SERPL-CCNC: 30 UNIT/L (ref 5–34)
BACTERIA #/AREA URNS AUTO: ABNORMAL /HPF
BASOPHILS # BLD AUTO: 0.05 X10(3)/MCL
BASOPHILS NFR BLD AUTO: 0.5 %
BILIRUB SERPL-MCNC: 0.4 MG/DL
BILIRUB UR QL STRIP.AUTO: NEGATIVE
BUN SERPL-MCNC: 10.8 MG/DL (ref 7–18.7)
CALCIUM SERPL-MCNC: 9.5 MG/DL (ref 8.4–10.2)
CHLORIDE SERPL-SCNC: 107 MMOL/L (ref 98–107)
CO2 SERPL-SCNC: 23 MMOL/L (ref 22–29)
COLOR UR AUTO: ABNORMAL
CREAT SERPL-MCNC: 0.74 MG/DL (ref 0.55–1.02)
CREAT UR-MCNC: 103.1 MG/DL (ref 45–106)
DEPRECATED CALCIDIOL+CALCIFEROL SERPL-MC: 10.8 NG/ML (ref 30–80)
EOSINOPHIL # BLD AUTO: 0.23 X10(3)/MCL (ref 0–0.9)
EOSINOPHIL NFR BLD AUTO: 2.1 %
ERYTHROCYTE [DISTWIDTH] IN BLOOD BY AUTOMATED COUNT: 12.4 % (ref 11.5–17)
EST. AVERAGE GLUCOSE BLD GHB EST-MCNC: 131.2 MG/DL
GFR SERPLBLD CREATININE-BSD FMLA CKD-EPI: >60 MLS/MIN/1.73/M2
GLOBULIN SER-MCNC: 3.7 GM/DL (ref 2.4–3.5)
GLUCOSE SERPL-MCNC: 125 MG/DL (ref 74–100)
GLUCOSE UR QL STRIP.AUTO: NORMAL
HBA1C MFR BLD: 6.2 %
HCT VFR BLD AUTO: 37 % (ref 37–47)
HGB BLD-MCNC: 12.6 G/DL (ref 12–16)
HYALINE CASTS #/AREA URNS LPF: ABNORMAL /LPF
IMM GRANULOCYTES # BLD AUTO: 0.03 X10(3)/MCL (ref 0–0.04)
IMM GRANULOCYTES NFR BLD AUTO: 0.3 %
KETONES UR QL STRIP.AUTO: NEGATIVE
LEUKOCYTE ESTERASE UR QL STRIP.AUTO: 250
LYMPHOCYTES # BLD AUTO: 2.71 X10(3)/MCL (ref 0.6–4.6)
LYMPHOCYTES NFR BLD AUTO: 24.7 %
MCH RBC QN AUTO: 28 PG (ref 27–31)
MCHC RBC AUTO-ENTMCNC: 34.1 G/DL (ref 33–36)
MCV RBC AUTO: 82.2 FL (ref 80–94)
MICROALBUMIN UR-MCNC: 17.8 UG/ML
MICROALBUMIN/CREAT RATIO PNL UR: 17.3 MG/GM CR (ref 0–30)
MONOCYTES # BLD AUTO: 0.52 X10(3)/MCL (ref 0.1–1.3)
MONOCYTES NFR BLD AUTO: 4.7 %
MUCOUS THREADS URNS QL MICRO: ABNORMAL /LPF
NEUTROPHILS # BLD AUTO: 7.45 X10(3)/MCL (ref 2.1–9.2)
NEUTROPHILS NFR BLD AUTO: 67.7 %
NITRITE UR QL STRIP.AUTO: NEGATIVE
NRBC BLD AUTO-RTO: 0 %
PH UR STRIP.AUTO: 6.5 [PH]
PLATELET # BLD AUTO: 379 X10(3)/MCL (ref 130–400)
PMV BLD AUTO: 9.7 FL (ref 7.4–10.4)
POTASSIUM SERPL-SCNC: 4 MMOL/L (ref 3.5–5.1)
PROT SERPL-MCNC: 7.5 GM/DL (ref 6.4–8.3)
PROT UR QL STRIP.AUTO: NEGATIVE
RBC # BLD AUTO: 4.5 X10(6)/MCL (ref 4.2–5.4)
RBC #/AREA URNS AUTO: ABNORMAL /HPF
RBC UR QL AUTO: NEGATIVE
SODIUM SERPL-SCNC: 137 MMOL/L (ref 136–145)
SP GR UR STRIP.AUTO: 1.02 (ref 1–1.03)
SQUAMOUS #/AREA URNS LPF: ABNORMAL /HPF
UROBILINOGEN UR STRIP-ACNC: NORMAL
WBC # SPEC AUTO: 10.99 X10(3)/MCL (ref 4.5–11.5)
WBC #/AREA URNS AUTO: ABNORMAL /HPF

## 2024-04-11 PROCEDURE — 3079F DIAST BP 80-89 MM HG: CPT | Mod: CPTII,,,

## 2024-04-11 PROCEDURE — 99214 OFFICE O/P EST MOD 30 MIN: CPT | Mod: S$PBB,,,

## 2024-04-11 PROCEDURE — 3044F HG A1C LEVEL LT 7.0%: CPT | Mod: CPTII,,,

## 2024-04-11 PROCEDURE — 99214 OFFICE O/P EST MOD 30 MIN: CPT | Mod: PBBFAC

## 2024-04-11 PROCEDURE — 1160F RVW MEDS BY RX/DR IN RCRD: CPT | Mod: CPTII,,,

## 2024-04-11 PROCEDURE — 82306 VITAMIN D 25 HYDROXY: CPT

## 2024-04-11 PROCEDURE — 82043 UR ALBUMIN QUANTITATIVE: CPT

## 2024-04-11 PROCEDURE — 36415 COLL VENOUS BLD VENIPUNCTURE: CPT

## 2024-04-11 PROCEDURE — 3061F NEG MICROALBUMINURIA REV: CPT | Mod: CPTII,,,

## 2024-04-11 PROCEDURE — 3066F NEPHROPATHY DOC TX: CPT | Mod: CPTII,,,

## 2024-04-11 PROCEDURE — 3008F BODY MASS INDEX DOCD: CPT | Mod: CPTII,,,

## 2024-04-11 PROCEDURE — 83036 HEMOGLOBIN GLYCOSYLATED A1C: CPT

## 2024-04-11 PROCEDURE — 81001 URINALYSIS AUTO W/SCOPE: CPT

## 2024-04-11 PROCEDURE — 80053 COMPREHEN METABOLIC PANEL: CPT

## 2024-04-11 PROCEDURE — 1159F MED LIST DOCD IN RCRD: CPT | Mod: CPTII,,,

## 2024-04-11 PROCEDURE — 85025 COMPLETE CBC W/AUTO DIFF WBC: CPT

## 2024-04-11 PROCEDURE — 3075F SYST BP GE 130 - 139MM HG: CPT | Mod: CPTII,,,

## 2024-04-11 RX ORDER — CHOLECALCIFEROL (VITAMIN D3) 125 MCG
5000 CAPSULE ORAL DAILY
Qty: 30 CAPSULE | Refills: 11 | COMMUNITY
Start: 2024-07-11 | End: 2025-07-11

## 2024-04-11 RX ORDER — SEMAGLUTIDE 0.68 MG/ML
INJECTION, SOLUTION SUBCUTANEOUS
Qty: 7.5 ML | Refills: 0 | Status: SHIPPED | OUTPATIENT
Start: 2024-04-11 | End: 2024-07-10

## 2024-04-11 RX ORDER — ASPIRIN 325 MG
50000 TABLET, DELAYED RELEASE (ENTERIC COATED) ORAL
Qty: 12 CAPSULE | Refills: 0 | Status: SHIPPED | OUTPATIENT
Start: 2024-04-11 | End: 2025-04-11

## 2024-04-11 NOTE — PATIENT INSTRUCTIONS
REMINDER: Please complete labs within 1 week of appointment.   Please complete satisfaction survey when received. Thank you.    Trey Mcleod,     If you are due for any health screening(s) below please notify me so we can arrange them to be ordered and scheduled. Most healthy patients at your age complete them, but you are free to accept or refuse.     If you can't do it, I'll definitely understand. If you can, I'd certainly appreciate it!    Tests to Keep You Healthy    Cervical Cancer Screening: Met on 2/1/2023  Last Blood Pressure <= 139/89 (4/11/2024): NO

## 2024-04-11 NOTE — ASSESSMENT & PLAN NOTE
Continue nystatin powder.  Previously referred to [facility], but was out of network. Since then, her insurance at work has changed, she will notify [facility].  If [facility] does not accept new insurance, patient was provided a list of podiatrist to call and notify clinic to update us.  Dry feet, in between toes and toenails thoroughly after bathing.   Dry feet and change socks frequently with excessive sweating.   Use/purchase tinactin powder spray; spray to affected area BID and keep toes  until area is completely dried.

## 2024-04-11 NOTE — ASSESSMENT & PLAN NOTE
Lab Results   Component Value Date    [id number] 10.8 (L) 04/11/2024     Educated on increasing foods high in Vitamin D such as fish oil, cod liver oil, salmon, milk fortified with vitamin D.  RX Vitamin D3 50732 IU weekly x 12 weeks.  Complete entire 12 weeks of Vitamin D prescription.  After completion of prescription (12 weeks/3 months), begin taking Vitamin D 5000 I.U. tablets daily (purchase over the counter).  Repeat Vitamin D level as ordered.

## 2024-04-11 NOTE — ASSESSMENT & PLAN NOTE
Lab Results   Component Value Date    HGBA1C 6.2 04/11/2024   At goal.   Latest Reference Range & Units 10/11/23 06:29 04/11/24 06:17   Glucose 74 - 100 mg/dL 126 (H) 125 (H)   (H): Data is abnormally high  Continue metformin as prescribed.  Rx ozempic  - fasting AM glucose not at goal.   Ozempic 0.25 mg weekly x4 weeks, then 0.5 mg weekly. Virtual visit in 8 weeks with CMP and A1c.  Notify clinic for any GI upset/discomfort.  Avoid soda, simple sweets, and limit rice/pasta/bread/starches and consume brown options when possible.    Maintain healthy weight with BMI goal <30.    Perform aerobic exercise for 150 minutes per week (or 5 days a week for 30 minutes each day).

## 2024-04-11 NOTE — ASSESSMENT & PLAN NOTE
Body mass index is 43.68 kg/m².  Goal BMI <30.  Aerobic exercise 150 minutes per week.  Avoid soda, simple sugars, sweets, excessive rice, pasta, potatoes or bread.   Choose brown options when available and portion control.  Limit fast foods and fried foods.   Choose complex carbs in moderation (ex: green, leafy vegetables, beans, oatmeal).  Eat plenty of fresh fruits and vegetables with lean meats daily.   Consider permanent healthy lifestyle changes.

## 2024-04-11 NOTE — PROGRESS NOTES
PATIENT NAME: Shani Suarez  : 1990  DATE: 24  MRN: [id number]          Reason for Visit/Chief Complaint   Pre-diabetes, Results, and Tinea Pedis       History of Present Illness (HPI)     Shani Suarez is a 33 y.o. Black female presenting in clinic today for Pre-diabetes, Results, and Tinea Pedis.  PMH: HTN, chronic tinea pedis, chronic left calcaneous pain, and prediabetes. She currently followed by [facility] clinic and previously followed by [facility] wound care.     All pertinent labs dated 2024 reviewed and discussed with patient.   H/o chronic tinea pedis that has been ongoing for approximately 2 years. She was previously referred to Dr. Silveira, but never received an appt. On 2023, she presented in clinic with irritation and pain in feet. She reported an odor in toes. She stated using a blow dryer on feet after taking a bath. She reported using tinactin spray, anti-fungal powder, terbinafine 250 mg daily x 14 days in 2021, genitian stacey, CMPD (compound) of clindamycin, mupirocin, itraconazole, and streptomycin mixed in a diluent and sprayed to the toes BID by [facility]. She was referred to Dr. Nic Welch, given prescription for diflucan weekly x6 weeks, and nystatin powder. She never did see Dr. Welch. Xray of bilat feet were normal. On 2023, she presented for follow up and voiced compliance with nystatin powder and completed diflucan regimen. On 2023, patient presented to [facility] with c/o toe pain, skin cracking. She was also noted to be hypertensive during that visit - 151/96. She was prescribed fluconazole 150 mg weekly x4 weeks and clotrimazole cream. She was referred to [facility] wound care, but was a no show on 2023. (3/13/2024) Patient reports same symptoms of sores in between toes with pain in bilateral heels. She reports being out of nystatin powder. She was referred to [facility] foot care. Today, patient presents in clinic with sneakers on.  She states the sores have improved, but they're still itchy. She states [facility] was out of network with her insurance, but her insurance has now changed and she will notify that clinic.     BP-137/86 - at goal. Voices compliance with amlodipine. Does not regularly check BP.  Attempting to follow a low sodium diet. Does not have a formal exercise regimen.     Denies smoking or illicit drug use. She drinks alcohol occasionally. Denies chest pain, shortness of breath, cough, headache, dizziness, weakness, abdominal pain, nausea, vomiting, diarrhea, constipation, dysuria, depression, anxiety, SI, and HI.     Cervical Cancer Screening - Last PAP: 2023. Follow up annually for PAP/pelvic exam. Scheduled: 2024.  Breast Cancer Screening - Deferred due to age. Will initiate testing at age 40.  Colon Cancer Screening - Deferred due to age. Will initiate testing at age 45.  Vaccinations: Flu - 2022 / Tetanus - 3/1/2018 /COVID - 2020, 2021, 10/19/2021  Eye Exam - Several years. List of local eye doctors provided to patient.  Dental Exam - Several years. List of local dentists provided to patient.     Review of Systems     Review of Systems   Constitutional: Negative.    HENT: Negative.     Eyes: Negative.    Respiratory: Negative.     Cardiovascular: Negative.    Gastrointestinal: Negative.    Endocrine: Negative.    Genitourinary: Negative.    Musculoskeletal: Negative.    Skin:  Positive for wound.   Allergic/Immunologic: Negative.    Neurological: Negative.    Hematological: Negative.    Psychiatric/Behavioral: Negative.     All other systems reviewed and are negative.      Medical / Social / Family History     Past Medical History:   Diagnosis Date    AC (acromioclavicular) arthritis     Diabetes mellitus, type 2     Hyperlipidemia     Hypertension          Past Surgical History:   Procedure Laterality Date     SECTION           Social History  Shani Tabitha Suarez's  reports that she  "has never smoked. She has never used smokeless tobacco. She reports that she does not currently use alcohol. She reports that she does not use drugs.    Family History  Shani Tabitha Suarez's family history includes Diabetes in her mother; Hypertension in her father.    Medications and Allergies     Medications  Current Outpatient Medications   Medication Instructions    ACCU-CHEK GUIDE GLUCOSE METER Misc CHECK BLOOD GLUCOSE ONCE DAILY    ACCU-CHEK GUIDE TEST STRIPS Strp TEST BLOOD GLUCOSE ONCE DAILY    ACCU-CHEK SOFTCLIX LANCETS Misc Topical (Top)    amLODIPine (NORVASC) 10 mg, Oral, Daily    cholecalciferol (vitamin D3) 50,000 Units, Oral, Every 7 days    [START ON 7/11/2024] cholecalciferol (vitamin D3) 5,000 Units, Oral, Daily    metFORMIN (GLUCOPHAGE) 500 mg, Oral, With breakfast    nystatin (MYCOSTATIN) powder Topical (Top), 2 times daily    semaglutide (OZEMPIC) 0.25 mg or 0.5 mg (2 mg/3 mL) pen injector Inject 0.25 mg into the skin every 7 days for 30 days, THEN 0.5 mg every 7 days.       Allergies  Review of patient's allergies indicates:  No Known Allergies    Physical Examination   Visit Vitals  /86 (BP Location: Right arm, Patient Position: Sitting, BP Method: Large (Automatic))   Pulse 86   Temp 98.4 °F (36.9 °C) (Oral)   Resp 20   Ht 4' 10" (1.473 m)   Wt 94.8 kg (208 lb 15.9 oz)   SpO2 99%   BMI 43.68 kg/m²     Physical Exam  Vitals reviewed.   Constitutional:       Appearance: Normal appearance. She is normal weight.   HENT:      Head: Normocephalic and atraumatic.      Right Ear: External ear normal.      Left Ear: External ear normal.      Nose: Nose normal.      Mouth/Throat:      Mouth: Mucous membranes are moist.      Pharynx: Oropharynx is clear.   Eyes:      Extraocular Movements: Extraocular movements intact.      Conjunctiva/sclera: Conjunctivae normal.      Pupils: Pupils are equal, round, and reactive to light.   Cardiovascular:      Rate and Rhythm: Normal rate and regular rhythm. "      Pulses: Normal pulses.      Heart sounds: Normal heart sounds.   Pulmonary:      Effort: Pulmonary effort is normal.      Breath sounds: Normal breath sounds.   Abdominal:      General: Bowel sounds are normal.      Palpations: Abdomen is soft.   Musculoskeletal:         General: Normal range of motion.      Cervical back: Normal range of motion and neck supple.   Feet:      Right foot:      Skin integrity: Dry skin present.      Left foot:      Skin integrity: Dry skin present.      Comments: Maceration in between 3rd and 4th and 4th and 5th toes. No foul odor.  Skin:     General: Skin is warm and dry.      Capillary Refill: Capillary refill takes less than 2 seconds.   Neurological:      General: No focal deficit present.      Mental Status: She is alert and oriented to person, place, and time.   Psychiatric:         Mood and Affect: Mood normal.         Behavior: Behavior normal.         Thought Content: Thought content normal.         Judgment: Judgment normal.           Results     Lab Results   Component Value Date    WBC 10.99 04/11/2024    RBC 4.50 04/11/2024    HGB 12.6 04/11/2024    HCT 37.0 04/11/2024    MCV 82.2 04/11/2024    MCH 28.0 04/11/2024    MCHC 34.1 04/11/2024    RDW 12.4 04/11/2024     04/11/2024    MPV 9.7 04/11/2024      Lab Results   Component Value Date     04/11/2024    K 4.0 04/11/2024    CHLORIDE 107 04/11/2024    CO2 23 04/11/2024    GLUCOSE 125 (H) 04/11/2024    BUN 10.8 04/11/2024    CREATININE 0.74 04/11/2024    LABPROT 7.5 04/11/2024    ALBUMIN 3.8 04/11/2024    BILITOT 0.4 04/11/2024    ALKPHOS 147 04/11/2024    AST 30 04/11/2024    ALT 20 04/11/2024    EGFRNORACEVR >60 04/11/2024     Lab Results   Component Value Date    TSH 0.724 10/11/2023     Lab Results   Component Value Date    CHOL 182 10/11/2023    HDL 43 10/11/2023    .00 10/11/2023    TRIG 86 10/11/2023     Lab Results   Component Value Date    COLORUA Light-Yellow 04/11/2024    SGUA 1.020  "04/11/2024    PROTEINUA Negative 04/11/2024    GLUCOSEUA Normal 04/11/2024    BILIRUBINUA Negative 04/11/2024    BLOODUA Negative 04/11/2024    WBCUA 0-5 04/11/2024    RBCUA 0-5 04/11/2024    BACTERIA None Seen 04/11/2024    NITRITESUA Negative 04/11/2024    LEUKOCYTESUR 250 (A) 04/11/2024    UROBILINOGEN Normal 04/11/2024     Lab Results   Component Value Date    CREATRANDUR 103.1 04/11/2024    MICALBCREAT 17.3 04/11/2024     Lab Results   Component Value Date    HZSYFDQJ40DF 10.8 (L) 04/11/2024     Lab Results   Component Value Date    HIV Nonreactive 10/17/2022    HEPAIGM Nonreactive 10/17/2022    HEPBCOREM Nonreactive 10/17/2022    HEPCAB Nonreactive 10/17/2022     No results found for: "FITDIAG", "COLOGUARD"  No results found for: "OCCBLDIA"    Assessment and Plan (including Health Maintenance)     Problem List Items Addressed This Visit          Derm    Tinea pedis    Current Assessment & Plan     Continue nystatin powder.  Previously referred to [facility], but was out of network. Since then, her insurance at work has changed, she will notify [facility].  If [facility] does not accept new insurance, patient was provided a list of podiatrist to call and notify clinic to update us.  Dry feet, in between toes and toenails thoroughly after bathing.   Dry feet and change socks frequently with excessive sweating.   Use/purchase tinactin powder spray; spray to affected area BID and keep toes  until area is completely dried.            Cardiac/Vascular    Primary hypertension - Primary    Current Assessment & Plan     BP Readings from Last 3 Encounters:   04/11/24 137/86   03/13/24 (!) 140/92   10/11/23 138/79      At goal.  Continue amlodipine to 10 mg daily.  Follow a low sodium (less than 2 grams of sodium per day), DASH diet.   Monitor blood pressure and report any consistent values greater than 140/90 and keep a log.  Maintain healthy weight with a BMI goal of <30.   Aerobic exercise " for 150 minutes per week (or 5 days a week for 30 minutes each day).             Endocrine    Class 3 severe obesity due to excess calories with serious comorbidity and body mass index (BMI) of 40.0 to 44.9 in adult    Current Assessment & Plan     Body mass index is 43.68 kg/m².  Goal BMI <30.  Aerobic exercise 150 minutes per week.  Avoid soda, simple sugars, sweets, excessive rice, pasta, potatoes or bread.   Choose brown options when available and portion control.  Limit fast foods and fried foods.   Choose complex carbs in moderation (ex: green, leafy vegetables, beans, oatmeal).  Eat plenty of fresh fruits and vegetables with lean meats daily.   Consider permanent healthy lifestyle changes.          Prediabetes    Current Assessment & Plan     Lab Results   Component Value Date    HGBA1C 6.2 04/11/2024   At goal.   Latest Reference Range & Units 10/11/23 06:29 04/11/24 06:17   Glucose 74 - 100 mg/dL 126 (H) 125 (H)   (H): Data is abnormally high  Continue metformin as prescribed.  Rx ozempic  - fasting AM glucose not at goal.   Ozempic 0.25 mg weekly x4 weeks, then 0.5 mg weekly. Virtual visit in 8 weeks with CMP and A1c.  Notify clinic for any GI upset/discomfort.  Avoid soda, simple sweets, and limit rice/pasta/bread/starches and consume brown options when possible.    Maintain healthy weight with BMI goal <30.    Perform aerobic exercise for 150 minutes per week (or 5 days a week for 30 minutes each day).           Relevant Medications    semaglutide (OZEMPIC) 0.25 mg or 0.5 mg (2 mg/3 mL) pen injector    Other Relevant Orders    Comprehensive Metabolic Panel    Microalbumin/Creatinine Ratio, Urine    Urinalysis    Vitamin D deficiency    Current Assessment & Plan     Lab Results   Component Value Date    LLOHNBOV44RR 10.8 (L) 04/11/2024   Educated on increasing foods high in Vitamin D such as fish oil, cod liver oil, salmon, milk fortified with vitamin D.  RX Vitamin D3 00103 IU weekly x 12  weeks.  Complete entire 12 weeks of Vitamin D prescription.  After completion of prescription (12 weeks/3 months), begin taking Vitamin D 5000 I.U. tablets daily (purchase over the counter).  Repeat Vitamin D level as ordered.          Relevant Medications    cholecalciferol, vitamin D3, 1,250 mcg (50,000 unit) capsule    cholecalciferol, vitamin D3, 125 mcg (5,000 unit) capsule (Start on 7/11/2024)        Health Maintenance Due   Topic Date Due    COVID-19 Vaccine (7 - 2023-24 season) 09/01/2023     All of your core healthy metrics are met.      Health Maintenance Topics with due status: Not Due       Topic Last Completion Date    TETANUS VACCINE 03/01/2018    Cervical Cancer Screening 02/01/2023    Hemoglobin A1c (Prediabetes) 04/11/2024       Future Appointments   Date Time Provider Department Center   5/23/2024  2:00 PM [facility]   6/7/2024  7:00 AM Juhi Dumont FNP [facility]        Follow up in about 8 weeks (around 6/6/2024) for RTC PRN, Virtual Visit, Lab review, Med check, Follow up.          Signature:        JOLENE Millan  [facility]  [address] [address]    Date of encounter: 4/11/24

## 2024-04-11 NOTE — ASSESSMENT & PLAN NOTE
BP Readings from Last 3 Encounters:   04/11/24 137/86   03/13/24 (!) 140/92   10/11/23 138/79      At goal.  Continue amlodipine to 10 mg daily.  Follow a low sodium (less than 2 grams of sodium per day), DASH diet.   Monitor blood pressure and report any consistent values greater than 140/90 and keep a log.  Maintain healthy weight with a BMI goal of <30.   Aerobic exercise for 150 minutes per week (or 5 days a week for 30 minutes each day).

## 2024-05-07 ENCOUNTER — OFFICE VISIT (OUTPATIENT)
Dept: GYNECOLOGY | Facility: CLINIC | Age: 34
End: 2024-05-07
Payer: COMMERCIAL

## 2024-05-07 VITALS
RESPIRATION RATE: 18 BRPM | TEMPERATURE: 98 F | OXYGEN SATURATION: 100 % | BODY MASS INDEX: 42.95 KG/M2 | HEART RATE: 85 BPM | WEIGHT: 204.63 LBS | SYSTOLIC BLOOD PRESSURE: 135 MMHG | HEIGHT: 58 IN | DIASTOLIC BLOOD PRESSURE: 86 MMHG

## 2024-05-07 DIAGNOSIS — Z01.419 WOMEN'S ANNUAL ROUTINE GYNECOLOGICAL EXAMINATION: Primary | ICD-10-CM

## 2024-05-07 LAB
C TRACH DNA SPEC QL NAA+PROBE: NOT DETECTED
CLUE CELLS VAG QL WET PREP: NORMAL
N GONORRHOEA DNA SPEC QL NAA+PROBE: NOT DETECTED
SOURCE (OHS): NORMAL
T VAGINALIS VAG QL WET PREP: NORMAL
WBC #/AREA VAG WET PREP: NORMAL
YEAST SPEC QL WET PREP: NORMAL

## 2024-05-07 PROCEDURE — 87491 CHLMYD TRACH DNA AMP PROBE: CPT | Performed by: NURSE PRACTITIONER

## 2024-05-07 PROCEDURE — 1159F MED LIST DOCD IN RCRD: CPT | Mod: CPTII,,, | Performed by: NURSE PRACTITIONER

## 2024-05-07 PROCEDURE — 99395 PREV VISIT EST AGE 18-39: CPT | Mod: S$PBB,,, | Performed by: NURSE PRACTITIONER

## 2024-05-07 PROCEDURE — 3066F NEPHROPATHY DOC TX: CPT | Mod: CPTII,,, | Performed by: NURSE PRACTITIONER

## 2024-05-07 PROCEDURE — 1160F RVW MEDS BY RX/DR IN RCRD: CPT | Mod: CPTII,,, | Performed by: NURSE PRACTITIONER

## 2024-05-07 PROCEDURE — 99214 OFFICE O/P EST MOD 30 MIN: CPT | Mod: PBBFAC | Performed by: NURSE PRACTITIONER

## 2024-05-07 PROCEDURE — 3061F NEG MICROALBUMINURIA REV: CPT | Mod: CPTII,,, | Performed by: NURSE PRACTITIONER

## 2024-05-07 PROCEDURE — 3008F BODY MASS INDEX DOCD: CPT | Mod: CPTII,,, | Performed by: NURSE PRACTITIONER

## 2024-05-07 PROCEDURE — 3075F SYST BP GE 130 - 139MM HG: CPT | Mod: CPTII,,, | Performed by: NURSE PRACTITIONER

## 2024-05-07 PROCEDURE — 3079F DIAST BP 80-89 MM HG: CPT | Mod: CPTII,,, | Performed by: NURSE PRACTITIONER

## 2024-05-07 PROCEDURE — 87210 SMEAR WET MOUNT SALINE/INK: CPT | Performed by: NURSE PRACTITIONER

## 2024-05-07 PROCEDURE — 3044F HG A1C LEVEL LT 7.0%: CPT | Mod: CPTII,,, | Performed by: NURSE PRACTITIONER

## 2024-05-07 PROCEDURE — 87591 N.GONORRHOEAE DNA AMP PROB: CPT | Performed by: NURSE PRACTITIONER

## 2024-05-07 NOTE — PROGRESS NOTES
"Patient ID: Shani Suarez is a 33 y.o. female.    Chief Complaint: Well Woman      Review of patient's allergies indicates:  No Known Allergies          HPI:  The patient is  here for annual gyn exam. Denies hx of abnormal pap smear. Last pap 2023-NIL; HPV negative. LMP 2024. Pt has regular monthly periods. Denies abnormal bleeding/discharge. Pt is . She had nexplanon removed last year. She declines contraception as she desires pregnancy. Denies pelvic pain. Denies urinary or breast complaints.  Contraceptive hx-depo provera, nexplanon. STI hx-chlamydia. Pt had negative HIV, Hep, Syphilis ab testing 10/2022. Denies fly hx of breast ,ovarian, uterine, or colon cancer. Pt is nonsmoker. PMHx-HTN/preDM.   Review of Systems:   Negative except for findings in HPI     Objective:   /86   Pulse 85   Temp 98.2 °F (36.8 °C) (Oral)   Resp 18   Ht 4' 10" (1.473 m)   Wt 92.8 kg (204 lb 9.6 oz)   LMP 2024 (Exact Date)   SpO2 100%   BMI 42.76 kg/m²    Physical Exam:  GENERAL: Pt is aware and alert and  in no acute distress.  BREASTS: Bilateral-No masses, nipple discharge, skin changes, or tenderness.  ABDOMEN: Soft, non tender.  VULVA:  No lesions or skin changes.  URETHRA: No lesions  BLADDER: No tenderness.  VAGINA: Mucosa normal,white discharge; no lesions.  CERVIX:  no CMT, NO discharge; NO lesions  BIMANUAL EXAM: reveals a 8-week-sized uterus. The uterus is mobile, nontender, no palpable masses. Biju adnexa reveal no evidence of masses; no fullness   SKIN: Warm and Dry  PSYCHIATRIC: Patient is awake and alert. Mood and affect are normal.    Assessment:   Women's annual routine gynecological examination  -     Wet Prep, Genital  -     Chlamydia/GC, PCR            1. Women's annual routine gynecological examination             -pap UTD per ACOG guidelines  -declined repeat hiv/hep/rpr screening  -avoid scented products to vaginal area  -encouraged otc prenatal vitamin  -Encouraged " healthy diet and exercise. Avoid soda and sugary drinks such as sports drinks and fruit juices.  Encouraged diet low in carbohydrates. Limit intake of rice/pasta/chips/bread/crackers/biscuits/pancakes/etc.    -discussed risks of pregnancy with HTN/preDM  Plan:       Follow up in about 1 year (around 5/7/2025).

## 2024-07-29 ENCOUNTER — OFFICE VISIT (OUTPATIENT)
Dept: URGENT CARE | Facility: CLINIC | Age: 34
End: 2024-07-29
Payer: COMMERCIAL

## 2024-07-29 VITALS
OXYGEN SATURATION: 99 % | WEIGHT: 201.81 LBS | RESPIRATION RATE: 17 BRPM | SYSTOLIC BLOOD PRESSURE: 136 MMHG | BODY MASS INDEX: 42.36 KG/M2 | TEMPERATURE: 99 F | HEIGHT: 58 IN | DIASTOLIC BLOOD PRESSURE: 90 MMHG | HEART RATE: 84 BPM

## 2024-07-29 DIAGNOSIS — H65.193 ACUTE OTITIS MEDIA WITH EFFUSION OF BOTH EARS: ICD-10-CM

## 2024-07-29 DIAGNOSIS — U07.1 COVID-19 VIRUS DETECTED: ICD-10-CM

## 2024-07-29 DIAGNOSIS — U07.1 COVID: Primary | ICD-10-CM

## 2024-07-29 DIAGNOSIS — R09.89 SYMPTOMS OF UPPER RESPIRATORY INFECTION (URI): ICD-10-CM

## 2024-07-29 LAB
CTP QC/QA: YES
SARS-COV-2 RDRP RESP QL NAA+PROBE: POSITIVE

## 2024-07-29 PROCEDURE — 87635 SARS-COV-2 COVID-19 AMP PRB: CPT | Mod: PBBFAC | Performed by: NURSE PRACTITIONER

## 2024-07-29 PROCEDURE — 99213 OFFICE O/P EST LOW 20 MIN: CPT | Mod: S$PBB,,, | Performed by: NURSE PRACTITIONER

## 2024-07-29 PROCEDURE — 99215 OFFICE O/P EST HI 40 MIN: CPT | Mod: PBBFAC | Performed by: NURSE PRACTITIONER

## 2024-07-29 RX ORDER — AMOXICILLIN 875 MG/1
875 TABLET, FILM COATED ORAL EVERY 12 HOURS
Qty: 20 TABLET | Refills: 0 | Status: SHIPPED | OUTPATIENT
Start: 2024-07-29 | End: 2024-08-08

## 2024-07-29 RX ORDER — FLUTICASONE PROPIONATE 50 MCG
2 SPRAY, SUSPENSION (ML) NASAL DAILY
Qty: 18.2 ML | Refills: 0 | Status: SHIPPED | OUTPATIENT
Start: 2024-07-29

## 2024-07-29 RX ORDER — ALBUTEROL SULFATE 90 UG/1
2 AEROSOL, METERED RESPIRATORY (INHALATION) EVERY 6 HOURS PRN
Qty: 1 G | Refills: 0 | Status: SHIPPED | OUTPATIENT
Start: 2024-07-29

## 2024-07-29 NOTE — PATIENT INSTRUCTIONS
Please follow instructions on patient education material.  Return to urgent care in 2 to 3 days if symptoms are not improving, immediately if you develop any new or worsening symptoms.     You have a middle ear infection.   This requires oral antibiotics, drops will not affect it.  Please start your antibiotic today and take it for 10 days, as directed.  - Nasal saline lavage  - OTC cold/flu products as desired for symptoms  - Plenty of fluids  - Humidified air  - Tylenol or Motrin for pain/fever if not contraindicated    If you have fever and symptoms the duration quarantine varies  I recommend a 5 day quarantine from day of symptom onset and you have been without fever for 24 hours.   COVID is contagious for an average of EIGHT DAYS, starting from Day 1 that you have symptoms.  You can spread COVID 2-3 days before you have symptoms.  The [facility] permits 5 days of quarantine. If you choose to quarantine for 5 days, wear a hospital-grade, surgical mask over your nose and mouth when around other people and in public through day 8.  A cloth mask provides no protection from spreading or lyle COVID.  The CDC states: When, for at least 24 hours, your symptoms are getting better overall and you have not had a fever (and are not using fever-reducing medication), you are typically less contagious, but it still takes more time for your body to fully get rid of the virus. During this time, you may still be able to spread the virus to others. Taking precautions for the next 5 days can help reduce this risk. After this 5-day period, you are typically much less likely to be contagious. However, some people, especially people with weakened immune systems, can continue to spread the virus for a longer period of time.   Go to the ER if you experience chest pain with shortness of breath, shortness of breath when moving around your house, high fevers 103.0+, excessive vomiting/diarrhea, or general distress..

## 2024-07-29 NOTE — PROGRESS NOTES
"Subjective:      Patient ID: Shani Suarez is a 34 y.o. female.    Vitals:  height is 4' 9.99" (1.473 m) and weight is 91.5 kg (201 lb 12.8 oz). Her oral temperature is 98.7 °F (37.1 °C). Her blood pressure is 136/90 (abnormal) and her pulse is 84. Her respiration is 17 and oxygen saturation is 99%.     Chief Complaint: sinus pressure (Left sided, since saturday)    HPI As stated in CC. PT also c/o left sided ear pain to touch and swelling.  Patient reports she had a sore throat 2-3 days ago but subsided with some TheraFlu over-the-counter.  Pt denies fever, cough, visual disturbances, shortness of breath or chest pain, dizziness weakness, stomach pain nausea or vomiting.    Skin:  Negative for erythema.      Objective:     Physical Exam   Constitutional: She is oriented to person, place, and time. She appears well-developed.  Non-toxic appearance. She does not appear ill. No distress.   HENT:   Head: Normocephalic and atraumatic. Head is without raccoon's eyes and without Francis's sign.   Ears:   Right Ear: Hearing normal. No swelling or tenderness. Tympanic membrane is injected, erythematous and bulging. A middle ear effusion is present.   Left Ear: Hearing normal. There is tenderness. No swelling. Tympanic membrane is injected, erythematous and bulging. A middle ear effusion is present.   Nose: Rhinorrhea and congestion present. No purulent discharge. Right sinus exhibits frontal sinus tenderness. Right sinus exhibits no maxillary sinus tenderness. Left sinus exhibits maxillary sinus tenderness and frontal sinus tenderness.   Mouth/Throat: Uvula is midline. Posterior oropharyngeal erythema present. Tonsils are 1+ on the right. Tonsils are 0 on the left.   Eyes: Conjunctivae are normal. Right eye exhibits no discharge. Left eye exhibits no discharge. Extraocular movement intact   Neck: Neck supple. No neck rigidity present.   Cardiovascular: Normal rate and regular rhythm.   Pulmonary/Chest: Effort normal " and breath sounds normal. No stridor. No respiratory distress. She has no wheezes. She has no rhonchi. She has no rales. She exhibits no tenderness.   Abdominal: Normal appearance. She exhibits no distension. There is no abdominal tenderness.   Musculoskeletal: Normal range of motion.         General: Normal range of motion.   Lymphadenopathy:     She has no cervical adenopathy.   Neurological: no focal deficit. She is alert and oriented to person, place, and time.   Skin: Skin is warm, dry, not diaphoretic and no rash. Capillary refill takes less than 2 seconds. No erythema   Psychiatric: Her behavior is normal. Mood, judgment and thought content normal.   Nursing note and vitals reviewed.      Assessment:     1. COVID    2. Acute otitis media with effusion of both ears    3. Symptoms of upper respiratory infection (URI)      Results for orders placed or performed in visit on 07/29/24   POCT COVID-19 Rapid Screening   Result Value Ref Range    POC Rapid COVID Positive (A) Negative     Acceptable Yes          Plan:   ER precautions given and discussed  Treated for otitis media bilaterally with effusion and instructed on symptomatic treatment  You have a middle ear infection.   This requires oral antibiotics, drops will not affect it.  Please start your antibiotic today and take it for 10 days, as directed.  - Nasal saline lavage  - OTC cold/flu products as desired for symptoms  - Plenty of fluids  - Humidified air  - Tylenol or Motrin for pain/fever if not contraindicated      COVID  -     albuterol (PROVENTIL HFA) 90 mcg/actuation inhaler; Inhale 2 puffs into the lungs every 6 (six) hours as needed for Wheezing or Shortness of Breath. Rescue  Dispense: 1 g; Refill: 0  -     fluticasone propionate (FLONASE) 50 mcg/actuation nasal spray; 2 sprays (100 mcg total) by Each Nostril route once daily.  Dispense: 18.2 mL; Refill: 0    Acute otitis media with effusion of both ears  -     amoxicillin (AMOXIL)  875 MG tablet; Take 1 tablet (875 mg total) by mouth every 12 (twelve) hours. for 10 days  Dispense: 20 tablet; Refill: 0    Symptoms of upper respiratory infection (URI)  -     POCT COVID-19 Rapid Screening          Medical Decision Making:   Differential Diagnosis:   Bacterial Sinusitis

## 2024-07-29 NOTE — LETTER
July 29, 2024      [facility]  [facility] [facility] [address]  Phone: [phone]       Patient: Shani Suarez   YOB: 1990  Date of Visit: 07/29/2024    To Whom It May Concern:    Elizabeth Suarez  was at [facility] on 07/29/2024. The patient may return to work/school on 08/02/2024 with no restrictions. If you have any questions or concerns, or if I can be of further assistance, please do not hesitate to contact me.    Sincerely,    JOLENE Loera

## 2024-11-27 ENCOUNTER — OCCUPATIONAL HEALTH (OUTPATIENT)
Dept: URGENT CARE | Facility: CLINIC | Age: 34
End: 2024-11-27

## 2024-11-27 DIAGNOSIS — Z23 ENCOUNTER FOR IMMUNIZATION: Primary | ICD-10-CM

## 2024-12-16 ENCOUNTER — OFFICE VISIT (OUTPATIENT)
Dept: URGENT CARE | Facility: CLINIC | Age: 34
End: 2024-12-16
Payer: COMMERCIAL

## 2024-12-16 VITALS
TEMPERATURE: 98 F | SYSTOLIC BLOOD PRESSURE: 152 MMHG | RESPIRATION RATE: 18 BRPM | OXYGEN SATURATION: 99 % | HEART RATE: 88 BPM | HEIGHT: 60 IN | DIASTOLIC BLOOD PRESSURE: 92 MMHG | WEIGHT: 207 LBS | BODY MASS INDEX: 40.64 KG/M2

## 2024-12-16 DIAGNOSIS — N30.01 ACUTE CYSTITIS WITH HEMATURIA: Primary | ICD-10-CM

## 2024-12-16 DIAGNOSIS — R39.9 URINARY SYMPTOM OR SIGN: ICD-10-CM

## 2024-12-16 LAB
BILIRUB UR QL STRIP: NEGATIVE
GLUCOSE UR QL STRIP: NEGATIVE
KETONES UR QL STRIP: POSITIVE
LEUKOCYTE ESTERASE UR QL STRIP: POSITIVE
PH, POC UA: 5.5
POC BLOOD, URINE: POSITIVE
POC NITRATES, URINE: POSITIVE
PROT UR QL STRIP: POSITIVE
SP GR UR STRIP: >=1.03 (ref 1–1.03)
UROBILINOGEN UR STRIP-ACNC: ABNORMAL (ref 0.1–1.1)

## 2024-12-16 PROCEDURE — 81003 URINALYSIS AUTO W/O SCOPE: CPT | Mod: PBBFAC

## 2024-12-16 PROCEDURE — 99213 OFFICE O/P EST LOW 20 MIN: CPT | Mod: S$PBB,,,

## 2024-12-16 PROCEDURE — 87086 URINE CULTURE/COLONY COUNT: CPT

## 2024-12-16 PROCEDURE — 99215 OFFICE O/P EST HI 40 MIN: CPT | Mod: PBBFAC

## 2024-12-16 RX ORDER — PHENAZOPYRIDINE HYDROCHLORIDE 200 MG/1
200 TABLET, FILM COATED ORAL 3 TIMES DAILY PRN
Qty: 6 TABLET | Refills: 0 | Status: SHIPPED | OUTPATIENT
Start: 2024-12-16 | End: 2024-12-18

## 2024-12-16 RX ORDER — NITROFURANTOIN 25; 75 MG/1; MG/1
100 CAPSULE ORAL 2 TIMES DAILY
Qty: 14 CAPSULE | Refills: 0 | Status: SHIPPED | OUTPATIENT
Start: 2024-12-16 | End: 2024-12-23

## 2024-12-16 NOTE — PROGRESS NOTES
Subjective:       Patient ID: Shani Suarez is a 34 y.o. female.    Vitals:  height is 5' (1.524 m) and weight is 93.9 kg (207 lb). Her oral temperature is 98.1 °F (36.7 °C). Her blood pressure is 152/92 (abnormal) and her pulse is 88. Her respiration is 18 and oxygen saturation is 99%.     Chief Complaint: Urinary Tract Infection (Entered by patient) and Urinary Frequency (Patient reports urinary frequency and vaginal discomfort. Symptoms started this morning. )    35 y/o AAF presents to clinic with , she reports urinary symptoms onset this morning.  States symptoms are similar to prior UTI.  Has not taken any medications for symptom improvement. Patient's last menstrual period was 12/06/2024 (approximate).       Urinary Tract Infection   Associated symptoms include frequency, hematuria and urgency. Pertinent negatives include no chills or flank pain.   Urinary Frequency   Associated symptoms include frequency, hematuria and urgency. Pertinent negatives include no chills or flank pain.       Constitution: Negative for chills and fever.   Gastrointestinal:  Negative for abdominal pain.   Genitourinary:  Positive for dysuria, frequency, urgency and hematuria. Negative for flank pain, missed menses, vaginal discharge and vaginal odor.       Objective:      Physical Exam   Constitutional: She is oriented to person, place, and time. She is cooperative. She is easily aroused.  Non-toxic appearance. She does not appear ill. obesityawake  HENT:   Head: Normocephalic and atraumatic.   Mouth/Throat: Uvula is midline, oropharynx is clear and moist and mucous membranes are normal.   Eyes: Conjunctivae and lids are normal.   Pulmonary/Chest: Effort normal.   Abdominal: Soft. flat abdomen There is no abdominal tenderness. There is no left CVA tenderness and no right CVA tenderness.   Musculoskeletal:      Right lower leg: No edema.      Left lower leg: No edema.   Neurological: She is alert, oriented to person,  place, and time and easily aroused. GCS eye subscore is 4. GCS verbal subscore is 5. GCS motor subscore is 6.   Skin: Skin is warm, dry, intact and not diaphoretic. Capillary refill takes less than 2 seconds.   Psychiatric: Her behavior is normal.   Nursing note and vitals reviewed.        Assessment:       1. Acute cystitis with hematuria    2. Urinary symptom or sign          Plan:     UA findings consistent with UTI, we will treat for uncomplicated UTI with Macrobid, culture is pending, staff will contact patient with any antibiotic adjustments.  Encouraged to increase oral fluids, avoid often, may take Pyridium as needed for discomfort.  When to seek ER attention discussed.    Acute cystitis with hematuria  -     nitrofurantoin, macrocrystal-monohydrate, (MACROBID) 100 MG capsule; Take 1 capsule (100 mg total) by mouth 2 (two) times daily. for 7 days  Dispense: 14 capsule; Refill: 0  -     phenazopyridine (PYRIDIUM) 200 MG tablet; Take 1 tablet (200 mg total) by mouth 3 (three) times daily as needed for Pain.  Dispense: 6 tablet; Refill: 0  -     Miscellaneous Test, Sendout [id number] (culture); Future; Expected date: 12/16/2024    Urinary symptom or sign  -     POCT Urinalysis, Dipstick, Manual, W/O Scope           Results for orders placed or performed in visit on 12/16/24   POCT Urinalysis, Dipstick, Manual, W/O Scope    Collection Time: 12/16/24  2:28 PM   Result Value Ref Range    POC Blood, Urine Positive (A) Negative    POC Bilirubin, Urine Negative Negative    POC Urobilinogen, Urine 0.2e.u./dl 0.1 - 1.1    POC Ketones, Urine Positive (A) Negative    POC Protein, Urine Positive (A) Negative    POC Nitrates, Urine Positive (A) Negative    POC Glucose, Urine Negative Negative    pH, UA 5.5     POC Specific Gravity, Urine >=1.030 1.003 - 1.029    POC Leukocytes, Urine Positive (A) Negative

## 2024-12-18 LAB — BACTERIA UR CULT: ABNORMAL

## 2025-04-28 ENCOUNTER — LAB VISIT (OUTPATIENT)
Dept: LAB | Facility: HOSPITAL | Age: 35
End: 2025-04-28
Payer: COMMERCIAL

## 2025-04-28 ENCOUNTER — RESULTS FOLLOW-UP (OUTPATIENT)
Dept: INTERNAL MEDICINE | Facility: CLINIC | Age: 35
End: 2025-04-28

## 2025-04-28 ENCOUNTER — OFFICE VISIT (OUTPATIENT)
Dept: INTERNAL MEDICINE | Facility: CLINIC | Age: 35
End: 2025-04-28
Payer: COMMERCIAL

## 2025-04-28 VITALS
HEART RATE: 76 BPM | WEIGHT: 207 LBS | RESPIRATION RATE: 17 BRPM | HEIGHT: 60 IN | SYSTOLIC BLOOD PRESSURE: 148 MMHG | TEMPERATURE: 98 F | OXYGEN SATURATION: 100 % | BODY MASS INDEX: 40.64 KG/M2 | DIASTOLIC BLOOD PRESSURE: 102 MMHG

## 2025-04-28 DIAGNOSIS — E66.813 CLASS 3 SEVERE OBESITY DUE TO EXCESS CALORIES WITH SERIOUS COMORBIDITY AND BODY MASS INDEX (BMI) OF 40.0 TO 44.9 IN ADULT: ICD-10-CM

## 2025-04-28 DIAGNOSIS — E78.2 MIXED HYPERLIPIDEMIA: ICD-10-CM

## 2025-04-28 DIAGNOSIS — Z00.00 WELL ADULT EXAM: Primary | ICD-10-CM

## 2025-04-28 DIAGNOSIS — E55.9 VITAMIN D DEFICIENCY: ICD-10-CM

## 2025-04-28 DIAGNOSIS — I10 PRIMARY HYPERTENSION: ICD-10-CM

## 2025-04-28 DIAGNOSIS — R73.03 PREDIABETES: ICD-10-CM

## 2025-04-28 DIAGNOSIS — E66.01 CLASS 3 SEVERE OBESITY DUE TO EXCESS CALORIES WITH SERIOUS COMORBIDITY AND BODY MASS INDEX (BMI) OF 40.0 TO 44.9 IN ADULT: ICD-10-CM

## 2025-04-28 DIAGNOSIS — B35.3 TINEA PEDIS OF BOTH FEET: ICD-10-CM

## 2025-04-28 LAB
25(OH)D3+25(OH)D2 SERPL-MCNC: 19 NG/ML (ref 30–80)
ALBUMIN SERPL-MCNC: 4 G/DL (ref 3.5–5)
ALBUMIN/GLOB SERPL: 1 RATIO (ref 1.1–2)
ALP SERPL-CCNC: 132 UNIT/L (ref 40–150)
ALT SERPL-CCNC: 15 UNIT/L (ref 0–55)
ANION GAP SERPL CALC-SCNC: 7 MEQ/L
AST SERPL-CCNC: 18 UNIT/L (ref 11–45)
BACTERIA #/AREA URNS AUTO: ABNORMAL /HPF
BASOPHILS # BLD AUTO: 0.05 X10(3)/MCL
BASOPHILS NFR BLD AUTO: 0.5 %
BILIRUB SERPL-MCNC: 0.5 MG/DL
BILIRUB UR QL STRIP.AUTO: NEGATIVE
BUN SERPL-MCNC: 11.3 MG/DL (ref 7–18.7)
CALCIUM SERPL-MCNC: 9.3 MG/DL (ref 8.4–10.2)
CHLORIDE SERPL-SCNC: 106 MMOL/L (ref 98–107)
CHOLEST SERPL-MCNC: 206 MG/DL
CHOLEST/HDLC SERPL: 4 {RATIO} (ref 0–5)
CLARITY UR: CLEAR
CO2 SERPL-SCNC: 24 MMOL/L (ref 22–29)
COLOR UR AUTO: ABNORMAL
CREAT SERPL-MCNC: 0.7 MG/DL (ref 0.55–1.02)
CREAT UR-MCNC: 119 MG/DL (ref 45–106)
CREAT/UREA NIT SERPL: 16
EOSINOPHIL # BLD AUTO: 0.23 X10(3)/MCL (ref 0–0.9)
EOSINOPHIL NFR BLD AUTO: 2.2 %
ERYTHROCYTE [DISTWIDTH] IN BLOOD BY AUTOMATED COUNT: 12.8 % (ref 11.5–17)
EST. AVERAGE GLUCOSE BLD GHB EST-MCNC: 125.5 MG/DL
GFR SERPLBLD CREATININE-BSD FMLA CKD-EPI: >60 ML/MIN/1.73/M2
GLOBULIN SER-MCNC: 4 GM/DL (ref 2.4–3.5)
GLUCOSE SERPL-MCNC: 109 MG/DL (ref 74–100)
GLUCOSE UR QL STRIP: NORMAL
HBA1C MFR BLD: 6 %
HCT VFR BLD AUTO: 39.9 % (ref 37–47)
HDLC SERPL-MCNC: 46 MG/DL (ref 35–60)
HGB BLD-MCNC: 13.3 G/DL (ref 12–16)
HGB UR QL STRIP: NEGATIVE
HYALINE CASTS #/AREA URNS LPF: ABNORMAL /LPF
IMM GRANULOCYTES # BLD AUTO: 0.04 X10(3)/MCL (ref 0–0.04)
IMM GRANULOCYTES NFR BLD AUTO: 0.4 %
KETONES UR QL STRIP: NEGATIVE
LDLC SERPL CALC-MCNC: 137 MG/DL (ref 50–140)
LEUKOCYTE ESTERASE UR QL STRIP: 250
LYMPHOCYTES # BLD AUTO: 2.52 X10(3)/MCL (ref 0.6–4.6)
LYMPHOCYTES NFR BLD AUTO: 23.6 %
MCH RBC QN AUTO: 28.2 PG (ref 27–31)
MCHC RBC AUTO-ENTMCNC: 33.3 G/DL (ref 33–36)
MCV RBC AUTO: 84.5 FL (ref 80–94)
MICROALBUMIN UR-MCNC: 20 UG/ML
MICROALBUMIN/CREAT RATIO PNL UR: 16.8 MG/GM CR (ref 0–30)
MONOCYTES # BLD AUTO: 0.46 X10(3)/MCL (ref 0.1–1.3)
MONOCYTES NFR BLD AUTO: 4.3 %
MUCOUS THREADS URNS QL MICRO: ABNORMAL /LPF
NEUTROPHILS # BLD AUTO: 7.37 X10(3)/MCL (ref 2.1–9.2)
NEUTROPHILS NFR BLD AUTO: 69 %
NITRITE UR QL STRIP: NEGATIVE
NRBC BLD AUTO-RTO: 0 %
PH UR STRIP: 6.5 [PH]
PLATELET # BLD AUTO: 371 X10(3)/MCL (ref 130–400)
PMV BLD AUTO: 9.7 FL (ref 7.4–10.4)
POTASSIUM SERPL-SCNC: 4.7 MMOL/L (ref 3.5–5.1)
PROT SERPL-MCNC: 8 GM/DL (ref 6.4–8.3)
PROT UR QL STRIP: NEGATIVE
RBC # BLD AUTO: 4.72 X10(6)/MCL (ref 4.2–5.4)
RBC #/AREA URNS AUTO: ABNORMAL /HPF
SODIUM SERPL-SCNC: 137 MMOL/L (ref 136–145)
SP GR UR STRIP.AUTO: 1.02 (ref 1–1.03)
SQUAMOUS #/AREA URNS LPF: ABNORMAL /HPF
T4 FREE SERPL-MCNC: 1.01 NG/DL (ref 0.7–1.48)
TRIGL SERPL-MCNC: 113 MG/DL (ref 37–140)
TSH SERPL-ACNC: 0.46 UIU/ML (ref 0.35–4.94)
UROBILINOGEN UR STRIP-ACNC: NORMAL
VLDLC SERPL CALC-MCNC: 23 MG/DL
WBC # BLD AUTO: 10.67 X10(3)/MCL (ref 4.5–11.5)
WBC #/AREA URNS AUTO: ABNORMAL /HPF

## 2025-04-28 PROCEDURE — 82306 VITAMIN D 25 HYDROXY: CPT

## 2025-04-28 PROCEDURE — 80061 LIPID PANEL: CPT

## 2025-04-28 PROCEDURE — 99214 OFFICE O/P EST MOD 30 MIN: CPT | Mod: PBBFAC

## 2025-04-28 PROCEDURE — 82570 ASSAY OF URINE CREATININE: CPT

## 2025-04-28 PROCEDURE — 84443 ASSAY THYROID STIM HORMONE: CPT

## 2025-04-28 PROCEDURE — 36415 COLL VENOUS BLD VENIPUNCTURE: CPT

## 2025-04-28 PROCEDURE — 83036 HEMOGLOBIN GLYCOSYLATED A1C: CPT

## 2025-04-28 PROCEDURE — 85025 COMPLETE CBC W/AUTO DIFF WBC: CPT

## 2025-04-28 PROCEDURE — 84439 ASSAY OF FREE THYROXINE: CPT

## 2025-04-28 PROCEDURE — 80053 COMPREHEN METABOLIC PANEL: CPT

## 2025-04-28 PROCEDURE — 81001 URINALYSIS AUTO W/SCOPE: CPT

## 2025-04-28 RX ORDER — ASPIRIN 325 MG
50000 TABLET, DELAYED RELEASE (ENTERIC COATED) ORAL
Qty: 12 CAPSULE | Refills: 1 | Status: SHIPPED | OUTPATIENT
Start: 2025-04-28 | End: 2026-04-28

## 2025-04-28 RX ORDER — AMLODIPINE BESYLATE 10 MG/1
10 TABLET ORAL DAILY
Qty: 90 TABLET | Refills: 2 | Status: SHIPPED | OUTPATIENT
Start: 2025-04-28 | End: 2026-04-28

## 2025-04-28 RX ORDER — METFORMIN HYDROCHLORIDE 500 MG/1
500 TABLET ORAL
Qty: 90 TABLET | Refills: 2 | Status: SHIPPED | OUTPATIENT
Start: 2025-04-28 | End: 2026-04-28

## 2025-04-28 RX ORDER — NYSTATIN 100000 [USP'U]/G
POWDER TOPICAL 2 TIMES DAILY
Qty: 60 G | Refills: 2 | Status: SHIPPED | OUTPATIENT
Start: 2025-04-28 | End: 2026-04-28

## 2025-04-28 NOTE — ASSESSMENT & PLAN NOTE
Wellness labs - 4/28/2025.   Cervical Cancer Screening - Last PAP: 2/1/2023. Follow up annually for PAP/pelvic exam.   Breast Cancer Screening - Deferred due to age. Will initiate testing at age 40.  Colon Cancer Screening - Deferred due to age. Will initiate testing at age 45.  Vaccinations: Flu - 11/27/2024 / Tetanus - 3/1/2018 /COVID - 12/9/2020, 1/19/2021, 10/19/2021  Eye Exam - Several years. List of local eye doctors provided to patient.  Dental Exam - Several years. List of local dentists provided to patient.

## 2025-04-28 NOTE — ASSESSMENT & PLAN NOTE
Lab Results   Component Value Date    [id number] 19 (L) 04/28/2025     Educated on increasing foods high in Vitamin D such as fish oil, cod liver oil, salmon, milk fortified with vitamin D.  Rx Vitamin D3 57083 iu weekly.  Once complete with weekly vitamin D, take vitamin D 2000 iu daily - purchase OTC.  Repeat Vitamin D level as ordered.

## 2025-04-28 NOTE — PROGRESS NOTES
PATIENT NAME: Shani Suarez  : 1990  DATE: 25  MRN: [id number]          Reason for Visit/Chief Complaint   Medication Refill and Annual Exam       History of Present Illness (HPI)     Shani Suarez is a 34 y.o. Black female presenting in clinic today for Medication Refill and Annual Exam. PMH: HTN, chronic tinea pedis, chronic left calcaneous pain, and prediabetes.    She currently followed by:  [facility] clinic - last office visit on 2024.  Previously followed by [facility] wound care for tinea pedis.     BP- 142/93, 148/102 - not at goal. Reports being out of amlodipine for 2 weeks. Does not regularly check BP.  Attempting to follow a low sodium diet. Does not have a formal exercise regimen. She reports difficulty with online medication refills due to  orders and pharmacy location change. Albuterol inhaler (previously prescribed for COVID-19) and Flonase have been discontinued. She reports manageable tinea pedis with nystatin powder. She reports previous attempts to obtain ozempic for prediabetes and weight loss, but were unsuccessful due to insurance coverage issues.      Denies smoking or illicit drug use. She drinks alcohol occasionally. Denies chest pain, shortness of breath, cough, headache, dizziness, weakness, abdominal pain, nausea, vomiting, diarrhea, constipation, dysuria, depression, anxiety, SI, and HI.     Cervical Cancer Screening - Last PAP: 2023. Follow up annually for PAP/pelvic exam.   Breast Cancer Screening - Deferred due to age. Will initiate testing at age 40.  Colon Cancer Screening - Deferred due to age. Will initiate testing at age 45.  Vaccinations: Flu - 2024 / Tetanus - 3/1/2018 /COVID - 2020, 2021, 10/19/2021  Eye Exam - Several years. List of local eye doctors provided to patient.  Dental Exam - Several years. List of local dentists provided to patient.        Review of Systems     Review of Systems   Constitutional: Negative.     HENT: Negative.     Eyes: Negative.    Respiratory: Negative.     Cardiovascular: Negative.    Gastrointestinal: Negative.    Endocrine: Negative.    Genitourinary: Negative.    Musculoskeletal: Negative.    Skin: Negative.    Allergic/Immunologic: Negative.    Neurological: Negative.    Hematological: Negative.    Psychiatric/Behavioral: Negative.     All other systems reviewed and are negative.      Medical / Social / Family History     Past Medical History:   Diagnosis Date    AC (acromioclavicular) arthritis     Diabetes mellitus, type 2     Hyperlipidemia     Hypertension     Vitamin D deficiency          Past Surgical History:   Procedure Laterality Date     SECTION           Social History  Shani Dempsey  reports that she has never smoked. She has never been exposed to tobacco smoke. She has never used smokeless tobacco. She reports that she does not currently use alcohol. She reports that she does not use drugs.    Family History  Shani Suarez's family history includes Diabetes in her mother; Hypertension in her father.    Medications and Allergies     Medications  Current Outpatient Medications   Medication Instructions    ACCU-CHEK GUIDE GLUCOSE METER Misc     ACCU-CHEK GUIDE TEST STRIPS Strp TEST BLOOD GLUCOSE ONCE DAILY    ACCU-CHEK SOFTCLIX LANCETS Misc Apply topically.    amLODIPine (NORVASC) 10 mg, Oral, Daily    cholecalciferol (vitamin D3) 50,000 Units, Oral, Every 7 days    metFORMIN (GLUCOPHAGE) 500 mg, Oral, With breakfast    nystatin (MYCOSTATIN) powder Topical (Top), 2 times daily       Allergies  Review of patient's allergies indicates:  No Known Allergies    Physical Examination   Visit Vitals  BP (!) 148/102 (BP Location: Right arm, Patient Position: Sitting)   Pulse 76   Temp 98.2 °F (36.8 °C) (Oral)   Resp 17   Ht 5' (1.524 m)   Wt 93.9 kg (207 lb)   SpO2 100%   BMI 40.43 kg/m²     Physical Exam  Vitals reviewed.   Constitutional:       Appearance: Normal  appearance. She is obese.   HENT:      Head: Normocephalic and atraumatic.      Right Ear: External ear normal.      Left Ear: External ear normal.      Nose: Nose normal.      Mouth/Throat:      Mouth: Mucous membranes are moist.      Pharynx: Oropharynx is clear.   Eyes:      Extraocular Movements: Extraocular movements intact.      Conjunctiva/sclera: Conjunctivae normal.      Pupils: Pupils are equal, round, and reactive to light.   Cardiovascular:      Rate and Rhythm: Normal rate and regular rhythm.      Pulses: Normal pulses.      Heart sounds: Normal heart sounds.   Pulmonary:      Effort: Pulmonary effort is normal.      Breath sounds: Normal breath sounds.   Abdominal:      General: Bowel sounds are normal.      Palpations: Abdomen is soft.   Musculoskeletal:         General: Normal range of motion.      Cervical back: Normal range of motion and neck supple.   Skin:     General: Skin is warm and dry.      Capillary Refill: Capillary refill takes less than 2 seconds.   Neurological:      General: No focal deficit present.      Mental Status: She is alert and oriented to person, place, and time.   Psychiatric:         Mood and Affect: Mood normal.         Behavior: Behavior normal.         Thought Content: Thought content normal.         Judgment: Judgment normal.           Results     Lab Results   Component Value Date    WBC 10.67 04/28/2025    RBC 4.72 04/28/2025    HGB 13.3 04/28/2025    HCT 39.9 04/28/2025    MCV 84.5 04/28/2025    MCH 28.2 04/28/2025    MCHC 33.3 04/28/2025    RDW 12.8 04/28/2025     04/28/2025    MPV 9.7 04/28/2025      Lab Results   Component Value Date     04/28/2025    K 4.7 04/28/2025    CO2 24 04/28/2025    GLUCOSE 109 (H) 04/28/2025    BUN 11.3 04/28/2025    CREATININE 0.70 04/28/2025    LABPROT 8.0 04/28/2025    ALBUMIN 4.0 04/28/2025    BILITOT 0.5 04/28/2025    ALKPHOS 132 04/28/2025    AST 18 04/28/2025    ALT 15 04/28/2025    AGAP 7.0 04/28/2025    EGFRNORACEVR  >60 04/28/2025     Lab Results   Component Value Date    TSH 0.461 04/28/2025     Lab Results   Component Value Date    CHOL 206 (H) 04/28/2025    HDL 46 04/28/2025    .00 04/28/2025    TRIG 113 04/28/2025     Lab Results   Component Value Date    SGUA 1.020 04/28/2025    PROTEINUA Negative 04/28/2025    BILIRUBINUA Negative 04/28/2025    WBCUA 0-5 04/28/2025    RBCUA 0-5 04/28/2025    BACTERIA None Seen 04/28/2025    LEUKOCYTESUR 250 (A) 04/28/2025    UROBILINOGEN Normal 04/28/2025     Lab Results   Component Value Date    CREATRANDUR 119.0 (H) 04/28/2025    MICALBCREAT 16.8 04/28/2025     Lab Results   Component Value Date    [id number] 19 (L) 04/28/2025     Lab Results   Component Value Date    HIV Nonreactive 10/17/2022    HEPAIGM Nonreactive 10/17/2022    HEPBSAG Nonreactive 10/17/2022    HEPCAB Nonreactive 10/17/2022     Assessment and Plan (including Health Maintenance)     Problem List Items Addressed This Visit       Hyperlipidemia    Current Assessment & Plan   Lab Results   Component Value Date    .00 04/28/2025       Lab Results   Component Value Date    TRIG 113 04/28/2025       Lab Results   Component Value Date    HDL 46 04/28/2025        Lab Results   Component Value Date    CHOL 206 (H) 04/28/2025      Continue statin as prescribed.   Follow a low cholesterol, low saturated fat diet with less than 200 mg of cholesterol a day.   Avoid fried foods and high saturated fats.  Add flax seed or fish oil supplements to diet.   Increase dietary fiber.   Regular exercise improves cholesterol levels.  Physical activity 5 times a week for 30 minutes per day (or 150 minutes per week).   Stressed importance of dietary modifications.          Relevant Orders    Lipid Panel (Completed)    Primary hypertension    Current Assessment & Plan   BP Readings from Last 3 Encounters:   04/28/25 (!) 148/102   12/16/24 (!) 152/92   07/29/24 (!) 136/90      Not at goal.  Reports being out of amlodipine for 2  weeks.   Continue amlodipine to 10 mg daily - refilled today.  Scheduled 6/2/2025 at 0920 for BP check.  Follow a low sodium (less than 2 grams of sodium per day), DASH diet.   Monitor blood pressure and report any consistent values greater than 140/90 and keep a log.  Maintain healthy weight with a BMI goal of <30.   Aerobic exercise for 150 minutes per week (or 5 days a week for 30 minutes each day).          Relevant Medications    amLODIPine (NORVASC) 10 MG tablet    Other Relevant Orders    Urinalysis, Reflex to Urine Culture (Completed)    Microalbumin/Creatinine Ratio, Urine (Completed)    CBC Auto Differential (Completed)    Comprehensive Metabolic Panel    Microalbumin/Creatinine Ratio, Urine    CBC Auto Differential    Urinalysis    Tinea pedis    Current Assessment & Plan   Improved.  Continue nystatin powder.  Dry feet, in between toes and toenails thoroughly after bathing.   Dry feet and change socks frequently with excessive sweating.   Use/purchase tinactin powder spray; spray to affected area BID and keep toes  until area is completely dried.         Relevant Medications    nystatin (MYCOSTATIN) powder    Class 3 severe obesity due to excess calories with serious comorbidity and body mass index (BMI) of 40.0 to 44.9 in adult    Current Assessment & Plan   Body mass index is 40.43 kg/m².  Goal BMI <30.  Aerobic exercise 150 minutes per week.  Avoid soda, simple sugars, sweets, excessive rice, pasta, potatoes or bread.   Choose brown options when available and portion control.  Limit fast foods and fried foods.   Choose complex carbs in moderation (ex: green, leafy vegetables, beans, oatmeal).  Eat plenty of fresh fruits and vegetables with lean meats daily.   Consider permanent healthy lifestyle changes.          Relevant Orders    Vitamin D (Completed)    TSH (Completed)    T4, Free (Completed)    Prediabetes    Current Assessment & Plan   Lab Results   Component Value Date    HGBA1C 6.0  04/28/2025     Medication initiation if HgbA1C becomes >6.5.  Avoid soda, simple sweets, and limit rice/pasta/bread/starches and consume brown options when possible.    Maintain healthy weight with BMI goal <30.    Perform aerobic exercise for 150 minutes per week (or 5 days a week for 30 minutes each day).           Relevant Medications    metFORMIN (GLUCOPHAGE) 500 MG tablet    Other Relevant Orders    Hemoglobin A1C (Completed)    Comprehensive Metabolic Panel (Completed)    Hemoglobin A1C    Vitamin D deficiency    Current Assessment & Plan   Lab Results   Component Value Date    [id number] 19 (L) 04/28/2025     Educated on increasing foods high in Vitamin D such as fish oil, cod liver oil, salmon, milk fortified with vitamin D.  Rx Vitamin D3 84919 iu weekly.  Once complete with weekly vitamin D, take vitamin D 2000 iu daily - purchase OTC.  Repeat Vitamin D level as ordered.           Relevant Medications    cholecalciferol, vitamin D3, 1,250 mcg (50,000 unit) capsule    Well adult exam - Primary    Current Assessment & Plan   Wellness labs - 4/28/2025.   Cervical Cancer Screening - Last PAP: 2/1/2023. Follow up annually for PAP/pelvic exam.   Breast Cancer Screening - Deferred due to age. Will initiate testing at age 40.  Colon Cancer Screening - Deferred due to age. Will initiate testing at age 45.  Vaccinations: Flu - 11/27/2024 / Tetanus - 3/1/2018 /COVID - 12/9/2020, 1/19/2021, 10/19/2021  Eye Exam - Several years. List of local eye doctors provided to patient.  Dental Exam - Several years. List of local dentists provided to patient.                 Health Maintenance Due   Topic Date Due    COVID-19 Vaccine (7 - 2024-25 season) 09/01/2024     Tests to Keep You Healthy    Cervical Cancer Screening: Met on 2/1/2023  Last Blood Pressure <= 139/89 (4/28/2025): NO      Health Maintenance Topics with due status: Not Due       Topic Last Completion Date    TETANUS VACCINE 03/01/2018    Cervical Cancer  Screening 02/01/2023    Hemoglobin A1c (Prediabetes) 04/28/2025    RSV Vaccine (Age 60+ and Pregnant patients) Not Due       Future Appointments   Date Time Provider Department Center   5/13/2025  9:10 AM Kayli Crespo, JOLENE [facility]   6/2/2025  9:20 AM Juhi Dumont FNP [facility]        Follow up in about 5 weeks (around 6/2/2025) for F2F, No labs due, Med check, HTN, RTC PRN.          Signature:        JOLENE Millan  [facility]  [address] [id number]    Date of encounter: 4/28/25    This note was generated with the assistance of ambient listening technology. Verbal consent was obtained by the patient and accompanying visitor(s) for the recording of patient appointment to facilitate this note. I attest to having reviewed and edited the generated note for accuracy, though some syntax or spelling errors may persist. Please contact the author of this note for any clarification.

## 2025-04-28 NOTE — ASSESSMENT & PLAN NOTE
Lab Results   Component Value Date    .00 04/28/2025       Lab Results   Component Value Date    TRIG 113 04/28/2025       Lab Results   Component Value Date    HDL 46 04/28/2025        Lab Results   Component Value Date    CHOL 206 (H) 04/28/2025      Continue statin as prescribed.   Follow a low cholesterol, low saturated fat diet with less than 200 mg of cholesterol a day.   Avoid fried foods and high saturated fats.  Add flax seed or fish oil supplements to diet.   Increase dietary fiber.   Regular exercise improves cholesterol levels.  Physical activity 5 times a week for 30 minutes per day (or 150 minutes per week).   Stressed importance of dietary modifications.

## 2025-04-28 NOTE — PATIENT INSTRUCTIONS
REMINDER: Please complete labs within 1 week of appointment.   Please complete satisfaction survey when received. Thank you.    Trey Mcleod,     If you are due for any health screening(s) below please notify me so we can arrange them to be ordered and scheduled. Most healthy patients at your age complete them, but you are free to accept or refuse.     If you can't do it, I'll definitely understand. If you can, I'd certainly appreciate it!    Tests to Keep You Healthy    Cervical Cancer Screening: Met on 2/1/2023  Last Blood Pressure <= 139/89 (4/28/2025): NO      Lets manage your high blood pressure     Your blood pressure was above 140/90 today during your visit. We recommend that you schedule a nurse visit in two weeks to check your blood pressure and discuss ways to support your health goals.     You can also manage your health and record your blood pressure from the comfort of home by keeping a daily blood pressure log. These results are shared with and reviewed by your provider. Please print this form (Daily Blood Pressure Log) to assist you in keeping track of your blood pressure at home.     Schedule your nurse visit in two weeks to learn more about how to track and manage high blood pressure.    Daily Blood Pressure Log    Name:__________________________________                  Date of Birth:_________    Average Blood Pressure:  __________      Date: Time  (a.m.) Blood  Pressure: Pulse  Rate: Time  (p.m.) Blood  Pressure : Pulse  Rate:   Sample 8:37 127/83 84

## 2025-04-28 NOTE — ASSESSMENT & PLAN NOTE
Body mass index is 40.43 kg/m².  Goal BMI <30.  Aerobic exercise 150 minutes per week.  Avoid soda, simple sugars, sweets, excessive rice, pasta, potatoes or bread.   Choose brown options when available and portion control.  Limit fast foods and fried foods.   Choose complex carbs in moderation (ex: green, leafy vegetables, beans, oatmeal).  Eat plenty of fresh fruits and vegetables with lean meats daily.   Consider permanent healthy lifestyle changes.  Provided referral information for [facility] weight management clinic.  Provided contact information for Ainsley Arteaga NP, for weight management options.

## 2025-04-28 NOTE — ASSESSMENT & PLAN NOTE
Lab Results   Component Value Date    HGBA1C 6.0 04/28/2025     Medication initiation if HgbA1C becomes >6.5.  Avoid soda, simple sweets, and limit rice/pasta/bread/starches and consume brown options when possible.    Maintain healthy weight with BMI goal <30.    Perform aerobic exercise for 150 minutes per week (or 5 days a week for 30 minutes each day).

## 2025-04-28 NOTE — ASSESSMENT & PLAN NOTE
Improved.  Continue nystatin powder.  Dry feet, in between toes and toenails thoroughly after bathing.   Dry feet and change socks frequently with excessive sweating.   Use/purchase tinactin powder spray; spray to affected area BID and keep toes  until area is completely dried.

## 2025-05-13 ENCOUNTER — OFFICE VISIT (OUTPATIENT)
Dept: GYNECOLOGY | Facility: CLINIC | Age: 35
End: 2025-05-13
Payer: COMMERCIAL

## 2025-05-13 ENCOUNTER — RESULTS FOLLOW-UP (OUTPATIENT)
Dept: GYNECOLOGY | Facility: CLINIC | Age: 35
End: 2025-05-13

## 2025-05-13 ENCOUNTER — LAB VISIT (OUTPATIENT)
Dept: LAB | Facility: HOSPITAL | Age: 35
End: 2025-05-13
Attending: NURSE PRACTITIONER
Payer: COMMERCIAL

## 2025-05-13 ENCOUNTER — PATIENT MESSAGE (OUTPATIENT)
Dept: GYNECOLOGY | Facility: CLINIC | Age: 35
End: 2025-05-13

## 2025-05-13 VITALS
HEIGHT: 60 IN | HEART RATE: 95 BPM | TEMPERATURE: 99 F | BODY MASS INDEX: 40.57 KG/M2 | SYSTOLIC BLOOD PRESSURE: 132 MMHG | DIASTOLIC BLOOD PRESSURE: 74 MMHG | WEIGHT: 206.63 LBS | RESPIRATION RATE: 18 BRPM | OXYGEN SATURATION: 98 %

## 2025-05-13 DIAGNOSIS — Z01.419 WOMEN'S ANNUAL ROUTINE GYNECOLOGICAL EXAMINATION: Primary | ICD-10-CM

## 2025-05-13 DIAGNOSIS — Z11.3 ROUTINE SCREENING FOR STI (SEXUALLY TRANSMITTED INFECTION): ICD-10-CM

## 2025-05-13 LAB
C TRACH DNA SPEC QL NAA+PROBE: NOT DETECTED
CLUE CELLS VAG QL WET PREP: ABNORMAL
HBV SURFACE AG SERPL QL IA: NONREACTIVE
HCV AB SERPL QL IA: NONREACTIVE
HIV 1+2 AB+HIV1 P24 AG SERPL QL IA: NONREACTIVE
N GONORRHOEA DNA SPEC QL NAA+PROBE: NOT DETECTED
SPECIMEN SOURCE: NORMAL
T PALLIDUM AB SER QL: NONREACTIVE
T VAGINALIS VAG QL WET PREP: ABNORMAL
WBC #/AREA VAG WET PREP: ABNORMAL
YEAST SPEC QL WET PREP: ABNORMAL

## 2025-05-13 PROCEDURE — 87340 HEPATITIS B SURFACE AG IA: CPT

## 2025-05-13 PROCEDURE — 87210 SMEAR WET MOUNT SALINE/INK: CPT | Performed by: NURSE PRACTITIONER

## 2025-05-13 PROCEDURE — 3061F NEG MICROALBUMINURIA REV: CPT | Mod: CPTII,,, | Performed by: NURSE PRACTITIONER

## 2025-05-13 PROCEDURE — 86780 TREPONEMA PALLIDUM: CPT

## 2025-05-13 PROCEDURE — 1160F RVW MEDS BY RX/DR IN RCRD: CPT | Mod: CPTII,,, | Performed by: NURSE PRACTITIONER

## 2025-05-13 PROCEDURE — 99214 OFFICE O/P EST MOD 30 MIN: CPT | Mod: PBBFAC | Performed by: NURSE PRACTITIONER

## 2025-05-13 PROCEDURE — 3078F DIAST BP <80 MM HG: CPT | Mod: CPTII,,, | Performed by: NURSE PRACTITIONER

## 2025-05-13 PROCEDURE — 1159F MED LIST DOCD IN RCRD: CPT | Mod: CPTII,,, | Performed by: NURSE PRACTITIONER

## 2025-05-13 PROCEDURE — 87389 HIV-1 AG W/HIV-1&-2 AB AG IA: CPT

## 2025-05-13 PROCEDURE — 3075F SYST BP GE 130 - 139MM HG: CPT | Mod: CPTII,,, | Performed by: NURSE PRACTITIONER

## 2025-05-13 PROCEDURE — 86803 HEPATITIS C AB TEST: CPT

## 2025-05-13 PROCEDURE — 36415 COLL VENOUS BLD VENIPUNCTURE: CPT

## 2025-05-13 PROCEDURE — 3066F NEPHROPATHY DOC TX: CPT | Mod: CPTII,,, | Performed by: NURSE PRACTITIONER

## 2025-05-13 PROCEDURE — 87591 N.GONORRHOEAE DNA AMP PROB: CPT | Performed by: NURSE PRACTITIONER

## 2025-05-13 PROCEDURE — 3044F HG A1C LEVEL LT 7.0%: CPT | Mod: CPTII,,, | Performed by: NURSE PRACTITIONER

## 2025-05-13 PROCEDURE — 99395 PREV VISIT EST AGE 18-39: CPT | Mod: S$PBB,,, | Performed by: NURSE PRACTITIONER

## 2025-05-13 PROCEDURE — 3008F BODY MASS INDEX DOCD: CPT | Mod: CPTII,,, | Performed by: NURSE PRACTITIONER

## 2025-05-13 RX ORDER — FLUCONAZOLE 150 MG/1
150 TABLET ORAL ONCE
Qty: 1 TABLET | Refills: 0 | Status: SHIPPED | OUTPATIENT
Start: 2025-05-13 | End: 2025-05-13

## 2025-05-13 NOTE — PROGRESS NOTES
Patient ID: Shani Suarez is a 34 y.o. female.    Chief Complaint: Gynecologic Exam      Review of patient's allergies indicates:  No Known Allergies          HPI:  The patient is  here for annual gyn exam. Denies hx of abnormal pap smear. Last pap 2023-NIL; HPV negative. LMP 2025. Pt has regular monthly periods lasting 5 days. Denies abnormal bleeding/discharge. Pt is . She declines contraception as she desires pregnancy. Denies pelvic pain. Denies urinary or breast complaints.  Contraceptive hx-depo provera, nexplanon. STI hx-chlamydia. Pt had negative HIV, Hep, Syphilis ab testing 10/2022. Denies fly hx of breast ,ovarian, uterine, or colon cancer. Pt is nonsmoker. PMHx-HTN/preDM.   Review of Systems:   Negative except for findings in HPI     Objective:   /74   Pulse 95   Temp 98.8 °F (37.1 °C) (Oral)   Resp 18   Ht 5' (1.524 m)   Wt 93.7 kg (206 lb 9.6 oz)   LMP 2025 (Exact Date)   SpO2 98%   BMI 40.35 kg/m²    Physical Exam:  GENERAL: Pt is aware and alert and  in no acute distress.  BREASTS: Bilateral-No masses, nipple discharge, skin changes, or tenderness.  ABDOMEN: Soft, non tender.  VULVA:  No lesions or skin changes.  URETHRA: No lesions  BLADDER: No tenderness.  VAGINA: Mucosa normal,scant amount of white discharge; no lesions.  CERVIX:  no CMT, NO discharge; NO lesions  BIMANUAL EXAM: reveals a 8-week-sized uterus. The uterus is mobile, nontender, no palpable masses. Biju adnexa reveal no evidence of masses; no fullness   SKIN: Warm and Dry  PSYCHIATRIC: Patient is awake and alert. Mood and affect are normal.  Assessment:   Women's annual routine gynecological examination    Routine screening for STI (sexually transmitted infection)  -     Wet Prep, Genital  -     SYPHILIS ANTIBODY (WITH REFLEX RPR); Future; Expected date: 2025  -     HIV 1/2 Ag/Ab (4th Gen); Future; Expected date: 2025  -     Hepatitis C Antibody; Future; Expected date:  05/13/2025  -     Chlamydia/GC, PCR  -     Hepatitis B Surface Antigen; Future; Expected date: 05/13/2025            1. Women's annual routine gynecological examination    2. Routine screening for STI (sexually transmitted infection)             -pap smear UTD  --encouraged otc prenatal vitamin   Plan:       Follow up in about 1 year (around 5/13/2026).

## 2025-05-13 NOTE — PROGRESS NOTES
Please inform patient of yeast infection. I have sent a one time dose of diflucan. Please encourage her to avoid bubble baths and scented products.